# Patient Record
Sex: MALE | Race: WHITE | NOT HISPANIC OR LATINO | Employment: OTHER | URBAN - METROPOLITAN AREA
[De-identification: names, ages, dates, MRNs, and addresses within clinical notes are randomized per-mention and may not be internally consistent; named-entity substitution may affect disease eponyms.]

---

## 2023-10-27 DIAGNOSIS — E11.69 TYPE 2 DIABETES MELLITUS WITH OTHER SPECIFIED COMPLICATION, WITHOUT LONG-TERM CURRENT USE OF INSULIN (MULTI): ICD-10-CM

## 2023-10-27 RX ORDER — METFORMIN HYDROCHLORIDE 1000 MG/1
1000 TABLET ORAL
COMMUNITY
Start: 2023-09-08 | End: 2023-10-27 | Stop reason: SDUPTHER

## 2023-11-01 DIAGNOSIS — E11.59 HYPERTENSION ASSOCIATED WITH TYPE 2 DIABETES MELLITUS (MULTI): ICD-10-CM

## 2023-11-01 DIAGNOSIS — I15.2 HYPERTENSION ASSOCIATED WITH TYPE 2 DIABETES MELLITUS (MULTI): ICD-10-CM

## 2023-11-01 DIAGNOSIS — E11.69 HYPERLIPIDEMIA ASSOCIATED WITH TYPE 2 DIABETES MELLITUS (MULTI): Primary | ICD-10-CM

## 2023-11-01 DIAGNOSIS — E78.5 HYPERLIPIDEMIA ASSOCIATED WITH TYPE 2 DIABETES MELLITUS (MULTI): Primary | ICD-10-CM

## 2023-11-01 RX ORDER — LOSARTAN POTASSIUM 50 MG/1
50 TABLET ORAL DAILY
COMMUNITY
End: 2023-11-01 | Stop reason: SDUPTHER

## 2023-11-01 RX ORDER — ATORVASTATIN CALCIUM 40 MG/1
40 TABLET, FILM COATED ORAL NIGHTLY
COMMUNITY
End: 2023-11-01 | Stop reason: SDUPTHER

## 2023-11-01 RX ORDER — HYDROCHLOROTHIAZIDE 12.5 MG/1
12.5 TABLET ORAL DAILY
COMMUNITY
End: 2023-11-01 | Stop reason: SDUPTHER

## 2023-11-01 RX ORDER — METFORMIN HYDROCHLORIDE 1000 MG/1
1000 TABLET ORAL
Qty: 180 TABLET | Refills: 1 | Status: SHIPPED | OUTPATIENT
Start: 2023-11-01 | End: 2024-05-14 | Stop reason: SDUPTHER

## 2023-11-01 NOTE — TELEPHONE ENCOUNTER
Patient's wife Misa calling to advise that [facility] pharmacy has been trying to obtain refills on Atorvastatin and hydrochlorothiazide.  LOV 8/30/23

## 2023-11-02 ENCOUNTER — TELEPHONE (OUTPATIENT)
Dept: PRIMARY CARE | Facility: CLINIC | Age: 73
End: 2023-11-02

## 2023-11-02 RX ORDER — ATORVASTATIN CALCIUM 40 MG/1
40 TABLET, FILM COATED ORAL NIGHTLY
Qty: 90 TABLET | Refills: 1 | Status: SHIPPED | OUTPATIENT
Start: 2023-11-02 | End: 2024-05-14 | Stop reason: SDUPTHER

## 2023-11-02 RX ORDER — HYDROCHLOROTHIAZIDE 12.5 MG/1
12.5 TABLET ORAL DAILY
Qty: 90 TABLET | Refills: 1 | Status: SHIPPED | OUTPATIENT
Start: 2023-11-02 | End: 2024-05-14 | Stop reason: SDUPTHER

## 2023-11-02 RX ORDER — LOSARTAN POTASSIUM 50 MG/1
50 TABLET ORAL DAILY
Qty: 90 TABLET | Refills: 1 | Status: SHIPPED | OUTPATIENT
Start: 2023-11-02 | End: 2024-05-14 | Stop reason: SDUPTHER

## 2023-11-02 NOTE — TELEPHONE ENCOUNTER
PATIENT'S WIFE CALLING TO REPORT THAT RX FOR METFORMIN WAS SENT TWICE DAILY AND RX WAS CHANGED TO DAILY AT LAST OV. I LOOKED AT LAST OV AND ADVISED THAT PHYSICIAN STATED THAT PATIENT CAN REDUCE IF HE CONTINUES TO LOOSE WEIGHT AND COMPLIANT WITH DIET. WIFE STATED THAT ACTUALLY PATIENT GAINED WEIGHT SINCE THEY JUST GOT BACK FROM VACATION, BS'S ARE RUNNING AROUND 110. STATED THAT PATIENT WILL TAKE METFORMIN BID, MONITOR WEIGHT AND BS'S FOR NOW AND RE-EVALUATE AT NEXT OV. NOTE SENT TO DR. MAGALI MEEKS MD FOR FYI.

## 2023-11-03 DIAGNOSIS — I15.2 HYPERTENSION ASSOCIATED WITH TYPE 2 DIABETES MELLITUS (MULTI): ICD-10-CM

## 2023-11-03 DIAGNOSIS — E11.59 HYPERTENSION ASSOCIATED WITH TYPE 2 DIABETES MELLITUS (MULTI): ICD-10-CM

## 2023-11-03 RX ORDER — AMLODIPINE BESYLATE 5 MG/1
5 TABLET ORAL DAILY
COMMUNITY
Start: 2023-07-16 | End: 2023-11-03 | Stop reason: SDUPTHER

## 2023-11-03 NOTE — TELEPHONE ENCOUNTER
Patients wife is requesting a refill on the following medication to be sent to [facility] in [place].     Last ov 8/2023  Pending OV: none

## 2023-11-06 RX ORDER — AMLODIPINE BESYLATE 5 MG/1
5 TABLET ORAL DAILY
Qty: 90 TABLET | Refills: 1 | Status: SHIPPED | OUTPATIENT
Start: 2023-11-06 | End: 2023-11-08 | Stop reason: SDUPTHER

## 2023-11-08 DIAGNOSIS — I15.2 HYPERTENSION ASSOCIATED WITH TYPE 2 DIABETES MELLITUS (MULTI): ICD-10-CM

## 2023-11-08 DIAGNOSIS — E11.59 HYPERTENSION ASSOCIATED WITH TYPE 2 DIABETES MELLITUS (MULTI): ICD-10-CM

## 2023-11-08 RX ORDER — AMLODIPINE BESYLATE 5 MG/1
5 TABLET ORAL DAILY
Qty: 90 TABLET | Refills: 1 | Status: SHIPPED | OUTPATIENT
Start: 2023-11-08 | End: 2024-05-14 | Stop reason: SDUPTHER

## 2024-02-16 ENCOUNTER — TELEPHONE (OUTPATIENT)
Dept: PRIMARY CARE | Facility: CLINIC | Age: 74
End: 2024-02-16

## 2024-02-16 DIAGNOSIS — M54.50 CHRONIC BILATERAL LOW BACK PAIN, UNSPECIFIED WHETHER SCIATICA PRESENT: Primary | ICD-10-CM

## 2024-02-16 DIAGNOSIS — G89.29 CHRONIC BILATERAL LOW BACK PAIN, UNSPECIFIED WHETHER SCIATICA PRESENT: Primary | ICD-10-CM

## 2024-02-16 NOTE — TELEPHONE ENCOUNTER
Patient and patients wife called office today for a renewal of his handicap placards. Patients wife advises that they need 2 for both of his vehicles Please advise thanks!
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
## 2024-05-14 ENCOUNTER — TELEPHONE (OUTPATIENT)
Dept: PRIMARY CARE | Facility: CLINIC | Age: 74
End: 2024-05-14

## 2024-05-14 DIAGNOSIS — E11.69 HYPERLIPIDEMIA ASSOCIATED WITH TYPE 2 DIABETES MELLITUS (MULTI): ICD-10-CM

## 2024-05-14 DIAGNOSIS — I15.2 HYPERTENSION ASSOCIATED WITH TYPE 2 DIABETES MELLITUS (MULTI): ICD-10-CM

## 2024-05-14 DIAGNOSIS — E11.69 TYPE 2 DIABETES MELLITUS WITH OTHER SPECIFIED COMPLICATION, WITHOUT LONG-TERM CURRENT USE OF INSULIN (MULTI): ICD-10-CM

## 2024-05-14 DIAGNOSIS — E11.59 HYPERTENSION ASSOCIATED WITH TYPE 2 DIABETES MELLITUS (MULTI): ICD-10-CM

## 2024-05-14 DIAGNOSIS — E78.5 HYPERLIPIDEMIA ASSOCIATED WITH TYPE 2 DIABETES MELLITUS (MULTI): ICD-10-CM

## 2024-05-14 RX ORDER — HYDROCHLOROTHIAZIDE 12.5 MG/1
12.5 TABLET ORAL DAILY
Qty: 90 TABLET | Refills: 1 | Status: SHIPPED | OUTPATIENT
Start: 2024-05-14

## 2024-05-14 RX ORDER — METFORMIN HYDROCHLORIDE 1000 MG/1
1000 TABLET ORAL
Qty: 180 TABLET | Refills: 1 | Status: SHIPPED | OUTPATIENT
Start: 2024-05-14

## 2024-05-14 RX ORDER — ATORVASTATIN CALCIUM 40 MG/1
40 TABLET, FILM COATED ORAL NIGHTLY
Qty: 90 TABLET | Refills: 1 | Status: SHIPPED | OUTPATIENT
Start: 2024-05-14

## 2024-05-14 RX ORDER — AMLODIPINE BESYLATE 5 MG/1
5 TABLET ORAL DAILY
Qty: 90 TABLET | Refills: 1 | Status: SHIPPED | OUTPATIENT
Start: 2024-05-14 | End: 2025-05-14

## 2024-05-14 RX ORDER — LOSARTAN POTASSIUM 50 MG/1
50 TABLET ORAL DAILY
Qty: 90 TABLET | Refills: 1 | Status: SHIPPED | OUTPATIENT
Start: 2024-05-14

## 2024-05-14 NOTE — TELEPHONE ENCOUNTER
Visit date 8/2023    [facility] - [place], [place] - [address]  [address]  [facility] [address]  Phone: [phone] Fax: [phone]    Next office visit none  The patient refuse to make a follow up appointment they wanted to wait a couple of months to book. I told them the doctor likes to check the sugars regularly. But they decline to make an appointment, Please advise

## 2024-06-18 ENCOUNTER — TELEPHONE (OUTPATIENT)
Dept: PRIMARY CARE | Facility: CLINIC | Age: 74
End: 2024-06-18

## 2024-06-18 DIAGNOSIS — E11.69 TYPE 2 DIABETES MELLITUS WITH OTHER SPECIFIED COMPLICATION, WITHOUT LONG-TERM CURRENT USE OF INSULIN (MULTI): ICD-10-CM

## 2024-06-18 NOTE — TELEPHONE ENCOUNTER
Per clinical personnel:    please call the patient and have him book an appointment he wants refills but has not been seen since August of last year.

## 2024-06-18 NOTE — TELEPHONE ENCOUNTER
Visit date 8/30/2023    [facility] - [place], [place] - [address]  [address]  [facility] [address]  Phone: [phone] Fax: [phone]    Next office visit none

## 2024-06-21 RX ORDER — METFORMIN HYDROCHLORIDE 1000 MG/1
1000 TABLET ORAL
Qty: 180 TABLET | Refills: 1 | Status: SHIPPED | OUTPATIENT
Start: 2024-06-21

## 2024-11-22 ENCOUNTER — HOSPITAL ENCOUNTER (EMERGENCY)
Facility: HOSPITAL | Age: 74
Discharge: HOME | End: 2024-11-23
Attending: STUDENT IN AN ORGANIZED HEALTH CARE EDUCATION/TRAINING PROGRAM
Payer: MEDICARE

## 2024-11-22 DIAGNOSIS — N28.9 RENAL LESION: ICD-10-CM

## 2024-11-22 DIAGNOSIS — N13.8 URINARY TRACT OBSTRUCTION BY KIDNEY STONE: Primary | ICD-10-CM

## 2024-11-22 DIAGNOSIS — N20.0 URINARY TRACT OBSTRUCTION BY KIDNEY STONE: Primary | ICD-10-CM

## 2024-11-22 PROCEDURE — 99285 EMERGENCY DEPT VISIT HI MDM: CPT | Performed by: STUDENT IN AN ORGANIZED HEALTH CARE EDUCATION/TRAINING PROGRAM

## 2024-11-22 PROCEDURE — 99284 EMERGENCY DEPT VISIT MOD MDM: CPT

## 2024-11-22 ASSESSMENT — LIFESTYLE VARIABLES
HAVE PEOPLE ANNOYED YOU BY CRITICIZING YOUR DRINKING: NO
EVER FELT BAD OR GUILTY ABOUT YOUR DRINKING: NO
EVER HAD A DRINK FIRST THING IN THE MORNING TO STEADY YOUR NERVES TO GET RID OF A HANGOVER: NO
HAVE YOU EVER FELT YOU SHOULD CUT DOWN ON YOUR DRINKING: NO
TOTAL SCORE: 0

## 2024-11-22 ASSESSMENT — COLUMBIA-SUICIDE SEVERITY RATING SCALE - C-SSRS
2. HAVE YOU ACTUALLY HAD ANY THOUGHTS OF KILLING YOURSELF?: NO
1. IN THE PAST MONTH, HAVE YOU WISHED YOU WERE DEAD OR WISHED YOU COULD GO TO SLEEP AND NOT WAKE UP?: NO
6. HAVE YOU EVER DONE ANYTHING, STARTED TO DO ANYTHING, OR PREPARED TO DO ANYTHING TO END YOUR LIFE?: NO

## 2024-11-22 ASSESSMENT — PAIN - FUNCTIONAL ASSESSMENT: PAIN_FUNCTIONAL_ASSESSMENT: 0-10

## 2024-11-22 ASSESSMENT — PAIN SCALES - GENERAL: PAINLEVEL_OUTOF10: 6

## 2024-11-23 ENCOUNTER — APPOINTMENT (OUTPATIENT)
Dept: RADIOLOGY | Facility: HOSPITAL | Age: 74
End: 2024-11-23
Payer: MEDICARE

## 2024-11-23 VITALS
TEMPERATURE: 98.2 F | BODY MASS INDEX: 33.24 KG/M2 | RESPIRATION RATE: 16 BRPM | WEIGHT: 259 LBS | OXYGEN SATURATION: 95 % | DIASTOLIC BLOOD PRESSURE: 86 MMHG | SYSTOLIC BLOOD PRESSURE: 180 MMHG | HEIGHT: 74 IN | HEART RATE: 73 BPM

## 2024-11-23 LAB
ALBUMIN SERPL BCP-MCNC: 4.1 G/DL (ref 3.4–5)
ALP SERPL-CCNC: 84 U/L (ref 33–136)
ALT SERPL W P-5'-P-CCNC: 16 U/L (ref 10–52)
ANION GAP SERPL CALC-SCNC: 15 MMOL/L (ref 10–20)
APPEARANCE UR: CLEAR
AST SERPL W P-5'-P-CCNC: 16 U/L (ref 9–39)
BASOPHILS # BLD AUTO: 0.05 X10*3/UL (ref 0–0.1)
BASOPHILS NFR BLD AUTO: 0.4 %
BILIRUB SERPL-MCNC: 0.7 MG/DL (ref 0–1.2)
BILIRUB UR STRIP.AUTO-MCNC: NEGATIVE MG/DL
BUN SERPL-MCNC: 21 MG/DL (ref 6–23)
CALCIUM SERPL-MCNC: 9.5 MG/DL (ref 8.6–10.3)
CHLORIDE SERPL-SCNC: 102 MMOL/L (ref 98–107)
CO2 SERPL-SCNC: 23 MMOL/L (ref 21–32)
COLOR UR: ABNORMAL
CREAT SERPL-MCNC: 1.15 MG/DL (ref 0.5–1.3)
EGFRCR SERPLBLD CKD-EPI 2021: 67 ML/MIN/1.73M*2
EOSINOPHIL # BLD AUTO: 0.62 X10*3/UL (ref 0–0.4)
EOSINOPHIL NFR BLD AUTO: 5.3 %
ERYTHROCYTE [DISTWIDTH] IN BLOOD BY AUTOMATED COUNT: 13.1 % (ref 11.5–14.5)
GLUCOSE SERPL-MCNC: 118 MG/DL (ref 74–99)
GLUCOSE UR STRIP.AUTO-MCNC: NORMAL MG/DL
HCT VFR BLD AUTO: 45.3 % (ref 41–52)
HGB BLD-MCNC: 15.1 G/DL (ref 13.5–17.5)
HOLD SPECIMEN: NORMAL
IMM GRANULOCYTES # BLD AUTO: 0.04 X10*3/UL (ref 0–0.5)
IMM GRANULOCYTES NFR BLD AUTO: 0.3 % (ref 0–0.9)
KETONES UR STRIP.AUTO-MCNC: ABNORMAL MG/DL
LEUKOCYTE ESTERASE UR QL STRIP.AUTO: NEGATIVE
LIPASE SERPL-CCNC: 23 U/L (ref 9–82)
LYMPHOCYTES # BLD AUTO: 1.73 X10*3/UL (ref 0.8–3)
LYMPHOCYTES NFR BLD AUTO: 14.8 %
MCH RBC QN AUTO: 30.2 PG (ref 26–34)
MCHC RBC AUTO-ENTMCNC: 33.3 G/DL (ref 32–36)
MCV RBC AUTO: 91 FL (ref 80–100)
MONOCYTES # BLD AUTO: 0.88 X10*3/UL (ref 0.05–0.8)
MONOCYTES NFR BLD AUTO: 7.5 %
MUCOUS THREADS #/AREA URNS AUTO: ABNORMAL /LPF
NEUTROPHILS # BLD AUTO: 8.4 X10*3/UL (ref 1.6–5.5)
NEUTROPHILS NFR BLD AUTO: 71.7 %
NITRITE UR QL STRIP.AUTO: NEGATIVE
NRBC BLD-RTO: 0 /100 WBCS (ref 0–0)
PH UR STRIP.AUTO: 6 [PH]
PLATELET # BLD AUTO: 206 X10*3/UL (ref 150–450)
POTASSIUM SERPL-SCNC: 3.5 MMOL/L (ref 3.5–5.3)
PROT SERPL-MCNC: 6.5 G/DL (ref 6.4–8.2)
PROT UR STRIP.AUTO-MCNC: ABNORMAL MG/DL
RBC # BLD AUTO: 5 X10*6/UL (ref 4.5–5.9)
RBC # UR STRIP.AUTO: ABNORMAL /UL
RBC #/AREA URNS AUTO: ABNORMAL /HPF
SODIUM SERPL-SCNC: 136 MMOL/L (ref 136–145)
SP GR UR STRIP.AUTO: >1.05
UROBILINOGEN UR STRIP.AUTO-MCNC: NORMAL MG/DL
WBC # BLD AUTO: 11.7 X10*3/UL (ref 4.4–11.3)
WBC #/AREA URNS AUTO: ABNORMAL /HPF

## 2024-11-23 PROCEDURE — 2500000002 HC RX 250 W HCPCS SELF ADMINISTERED DRUGS (ALT 637 FOR MEDICARE OP, ALT 636 FOR OP/ED): Performed by: STUDENT IN AN ORGANIZED HEALTH CARE EDUCATION/TRAINING PROGRAM

## 2024-11-23 PROCEDURE — 36415 COLL VENOUS BLD VENIPUNCTURE: CPT

## 2024-11-23 PROCEDURE — 85025 COMPLETE CBC W/AUTO DIFF WBC: CPT

## 2024-11-23 PROCEDURE — 2550000001 HC RX 255 CONTRASTS: Performed by: STUDENT IN AN ORGANIZED HEALTH CARE EDUCATION/TRAINING PROGRAM

## 2024-11-23 PROCEDURE — 2500000004 HC RX 250 GENERAL PHARMACY W/ HCPCS (ALT 636 FOR OP/ED)

## 2024-11-23 PROCEDURE — 96374 THER/PROPH/DIAG INJ IV PUSH: CPT | Mod: 59

## 2024-11-23 PROCEDURE — 80053 COMPREHEN METABOLIC PANEL: CPT

## 2024-11-23 PROCEDURE — 81001 URINALYSIS AUTO W/SCOPE: CPT

## 2024-11-23 PROCEDURE — 74174 CTA ABD&PLVS W/CONTRAST: CPT | Performed by: RADIOLOGY

## 2024-11-23 PROCEDURE — 74174 CTA ABD&PLVS W/CONTRAST: CPT

## 2024-11-23 PROCEDURE — 2500000004 HC RX 250 GENERAL PHARMACY W/ HCPCS (ALT 636 FOR OP/ED): Performed by: STUDENT IN AN ORGANIZED HEALTH CARE EDUCATION/TRAINING PROGRAM

## 2024-11-23 PROCEDURE — 83690 ASSAY OF LIPASE: CPT

## 2024-11-23 PROCEDURE — 96375 TX/PRO/DX INJ NEW DRUG ADDON: CPT | Mod: 59

## 2024-11-23 RX ORDER — TAMSULOSIN HYDROCHLORIDE 0.4 MG/1
0.4 CAPSULE ORAL ONCE
Status: COMPLETED | OUTPATIENT
Start: 2024-11-23 | End: 2024-11-23

## 2024-11-23 RX ORDER — ONDANSETRON 4 MG/1
4 TABLET, FILM COATED ORAL EVERY 6 HOURS
Qty: 12 TABLET | Refills: 0 | Status: SHIPPED | OUTPATIENT
Start: 2024-11-23 | End: 2024-11-26

## 2024-11-23 RX ORDER — TAMSULOSIN HYDROCHLORIDE 0.4 MG/1
0.4 CAPSULE ORAL DAILY
Qty: 10 CAPSULE | Refills: 0 | Status: SHIPPED | OUTPATIENT
Start: 2024-11-23 | End: 2024-12-03

## 2024-11-23 RX ORDER — KETOROLAC TROMETHAMINE 15 MG/ML
15 INJECTION, SOLUTION INTRAMUSCULAR; INTRAVENOUS ONCE
Status: COMPLETED | OUTPATIENT
Start: 2024-11-23 | End: 2024-11-23

## 2024-11-23 RX ORDER — MORPHINE SULFATE 4 MG/ML
4 INJECTION, SOLUTION INTRAMUSCULAR; INTRAVENOUS ONCE
Status: COMPLETED | OUTPATIENT
Start: 2024-11-23 | End: 2024-11-23

## 2024-11-23 RX ORDER — OXYCODONE HYDROCHLORIDE 5 MG/1
5 TABLET ORAL EVERY 8 HOURS PRN
Qty: 9 TABLET | Refills: 0 | Status: SHIPPED | OUTPATIENT
Start: 2024-11-23 | End: 2024-11-26

## 2024-11-23 RX ORDER — NAPROXEN 500 MG/1
500 TABLET ORAL 2 TIMES DAILY PRN
Qty: 20 TABLET | Refills: 0 | Status: SHIPPED | OUTPATIENT
Start: 2024-11-23

## 2024-11-23 RX ADMIN — TAMSULOSIN HYDROCHLORIDE 0.4 MG: 0.4 CAPSULE ORAL at 03:48

## 2024-11-23 RX ADMIN — KETOROLAC TROMETHAMINE 15 MG: 15 INJECTION, SOLUTION INTRAMUSCULAR; INTRAVENOUS at 03:48

## 2024-11-23 RX ADMIN — MORPHINE SULFATE 4 MG: 4 INJECTION, SOLUTION INTRAMUSCULAR; INTRAVENOUS at 02:35

## 2024-11-23 RX ADMIN — IOHEXOL 100 ML: 350 INJECTION, SOLUTION INTRAVENOUS at 02:09

## 2024-11-23 ASSESSMENT — PAIN SCALES - GENERAL
PAINLEVEL_OUTOF10: 0 - NO PAIN
PAINLEVEL_OUTOF10: 5 - MODERATE PAIN
PAINLEVEL_OUTOF10: 0 - NO PAIN

## 2024-11-23 ASSESSMENT — PAIN - FUNCTIONAL ASSESSMENT: PAIN_FUNCTIONAL_ASSESSMENT: 0-10

## 2024-11-23 ASSESSMENT — PAIN DESCRIPTION - LOCATION: LOCATION: ABDOMEN

## 2024-11-23 NOTE — ED PROVIDER NOTES
EMERGENCY DEPARTMENT ENCOUNTER      Pt Name: Kirill Adam  MRN: [id number]  Birthdate 1950  Date of evaluation: 11/22/2024  Provider: Easton Tadeo DO    CHIEF COMPLAINT       Chief Complaint   Patient presents with    Abdominal Pain     Abd pain RLQ that is constant 6/10 dull pain. Pt has a bowel movement this morning and noticed bright red blood in the toilet; pt had x2 bouts of bleeding from his rectum today.          HISTORY OF PRESENT ILLNESS    74-year-old male, history of diabetes, ventral wall abdominal hernia repair, hypertension, hyperlipidemia, comes emergency with intermittent right lower quadrant abdominal pain, increased bloating.  Bloating was started yesterday, right lower quadrant abdominal pain today, last time he had pain was on the way here, not currently having it.  Did have 2 episodes of bloody stools today, has been having harder stools than normal over the last couple days.  States active bleeding after a hard stool.  No lightheadedness, no chest pain, no shortness of breath.  No vomiting or nausea, no chills, no frequency, urgency, hematuria, dysuria.  No other symptoms.      History provided by:  Patient      Nursing Notes were reviewed.    PAST MEDICAL HISTORY     Past Medical History:   Diagnosis Date    Diabetes mellitus (Multi)     Hypercholesteremia     Hypertension          SURGICAL HISTORY       Past Surgical History:   Procedure Laterality Date    OTHER SURGICAL HISTORY  04/29/2022    Back surgery    OTHER SURGICAL HISTORY  04/29/2022    Eye surgery    OTHER SURGICAL HISTORY  04/29/2022    Foot fracture repair    OTHER SURGICAL HISTORY  04/29/2022    Hernia repair         CURRENT MEDICATIONS       Previous Medications    AMLODIPINE (NORVASC) 5 MG TABLET    Take 1 tablet (5 mg) by mouth once daily.    ATORVASTATIN (LIPITOR) 40 MG TABLET    Take 1 tablet (40 mg) by mouth once daily at bedtime.    HYDROCHLOROTHIAZIDE (MICROZIDE) 12.5 MG TABLET    Take 1 tablet (12.5 mg) by mouth  once daily.    LOSARTAN (COZAAR) 50 MG TABLET    Take 1 tablet (50 mg) by mouth once daily.    METFORMIN (GLUCOPHAGE) 1,000 MG TABLET    Take 1 tablet (1,000 mg) by mouth 2 times daily (morning and late afternoon). Take with food.       ALLERGIES     Patient has no known allergies.    FAMILY HISTORY     No family history on file.       SOCIAL HISTORY       Social History     Socioeconomic History    Marital status:    Tobacco Use    Smoking status: Never    Smokeless tobacco: Never   Vaping Use    Vaping status: Never Used   Substance and Sexual Activity    Alcohol use: Yes     Comment: socially    Drug use: Never       SCREENINGS                        PHYSICAL EXAM    (up to 7 for level 4, 8 or more for level 5)     ED Triage Vitals [11/22/24 2339]   Temperature Heart Rate Respirations BP   36.8 °C (98.2 °F) 74 18 (!) 203/98      Pulse Ox Temp Source Heart Rate Source Patient Position   95 % Temporal -- --      BP Location FiO2 (%)     -- --       Physical Exam  Vitals and nursing note reviewed.   Constitutional:       General: He is not in acute distress.  HENT:      Head: Normocephalic and atraumatic.   Eyes:      General: No scleral icterus.        Right eye: No discharge.         Left eye: No discharge.      Conjunctiva/sclera: Conjunctivae normal.   Cardiovascular:      Rate and Rhythm: Normal rate and regular rhythm.      Pulses: Normal pulses.   Pulmonary:      Effort: Pulmonary effort is normal.   Abdominal:      General: Abdomen is flat.      Palpations: Abdomen is soft.      Tenderness: There is no abdominal tenderness. There is no guarding or rebound.   Genitourinary:     Comments: There is dried blood around the rectum and on patient's underwear.  Blood is bright red.  There are no obvious anal fissures.  There is a small external hemorrhoid.  No active bleeding.  Musculoskeletal:         General: No deformity.      Right lower leg: No edema.      Left lower leg: No edema.   Skin:     General:  Skin is warm and dry.   Neurological:      Mental Status: He is alert and oriented to person, place, and time. Mental status is at baseline.   Psychiatric:         Mood and Affect: Mood normal.         Behavior: Behavior normal.          DIAGNOSTIC RESULTS     LABS:  Labs Reviewed   CBC WITH AUTO DIFFERENTIAL - Abnormal       Result Value    WBC 11.7 (*)     nRBC 0.0      RBC 5.00      Hemoglobin 15.1      Hematocrit 45.3      MCV 91      MCH 30.2      MCHC 33.3      RDW 13.1      Platelets 206      Neutrophils % 71.7      Immature Granulocytes %, Automated 0.3      Lymphocytes % 14.8      Monocytes % 7.5      Eosinophils % 5.3      Basophils % 0.4      Neutrophils Absolute 8.40 (*)     Immature Granulocytes Absolute, Automated 0.04      Lymphocytes Absolute 1.73      Monocytes Absolute 0.88 (*)     Eosinophils Absolute 0.62 (*)     Basophils Absolute 0.05     COMPREHENSIVE METABOLIC PANEL - Abnormal    Glucose 118 (*)     Sodium 136      Potassium 3.5      Chloride 102      Bicarbonate 23      Anion Gap 15      Urea Nitrogen 21      Creatinine 1.15      eGFR 67      Calcium 9.5      Albumin 4.1      Alkaline Phosphatase 84      Total Protein 6.5      AST 16      Bilirubin, Total 0.7      ALT 16     URINALYSIS WITH REFLEX CULTURE AND MICROSCOPIC - Abnormal    Color, Urine Light-Yellow      Appearance, Urine Clear      Specific Gravity, Urine >1.050 (*)     pH, Urine 6.0      Protein, Urine 10 (TRACE)      Glucose, Urine Normal      Blood, Urine 0.1 (1+) (*)     Ketones, Urine TRACE (*)     Bilirubin, Urine NEGATIVE      Urobilinogen, Urine Normal      Nitrite, Urine NEGATIVE      Leukocyte Esterase, Urine NEGATIVE     URINALYSIS MICROSCOPIC WITH REFLEX CULTURE - Abnormal    WBC, Urine NONE      RBC, Urine 11-20 (*)     Mucus, Urine FEW     LIPASE - Normal    Lipase 23      Narrative:     Venipuncture immediately after or during the administration of Metamizole may lead to falsely low results. Testing should be  performed immediately prior to Metamizole dosing.   URINALYSIS WITH REFLEX CULTURE AND MICROSCOPIC    Narrative:     The following orders were created for panel order Urinalysis with Reflex Culture and Microscopic.  Procedure                               Abnormality         Status                     ---------                               -----------         ------                     Urinalysis with Reflex C...[[id number]]  Abnormal            Final result               Extra Urine Gray Tube[[id number]]                            In process                   Please view results for these tests on the individual orders.   EXTRA URINE GRAY TUBE       All other labs were within normal range or not returned as of this dictation.    Imaging  CT angio abdomen pelvis w and or wo IV IV contrast   Final Result   No evidence of active GI bleed.        Colonic diverticulosis without acute diverticulitis.        No aortic aneurysm or dissection. No significant stenosis of the   aorta or visceral branches.        6 mm calculus in the distal right ureter with mild right   hydronephrosis.        Indeterminate enhancement of an 11 mm cortical lesion in the lower   pole left kidney. The lesion is not well seen on the precontrast CT.   The follow-up nonemergent renal protocol MRI is recommended to   exclude a solid lesion.        MACRO:   Critical Finding:  See findings. Notification was initiated on   11/23/2024 at 2:45 am by  Katharine Jones.  (**-YCF-**) Instructions:        Signed by: Katharine Jones 11/23/2024 2:45 AM   Dictation workstation:   [id number]           Procedures  Procedures     EMERGENCY DEPARTMENT COURSE/MDM:     Diagnoses as of 11/23/24 0405   Urinary tract obstruction by kidney stone   Renal lesion        Medical Decision Making  74-year-old male comes emergency with right lower quadrant abdominal pain, differentials for kidney stone, UTI, appendicitis.  Abdominal lab workup, CT abdomen ordered to assess for  these etiologies.  He was having some rectal bleeding as well, likely secondary to a hemorrhoid, did do a rectal exam, saw obvious dried blood on patient's underwear, and a small external hemorrhoid but no anal fissure.    On my independent review of labs, he does have a small white blood cell count 11.7.  Normal hemoglobin, normal platelet count.  Chemistry shows no electrolyte derangements, normal renal and hepatic function.  Urinalysis shows blood, no UTI, lipase negative, no CT evidence of pancreatitis.  He does have an incidental finding on the CT scan, he was given a copy of his CT report printed to him, referral was placed to the [facility] diagnostic clinic, he will also follow-up with his PCP for further management of this.  He also has a right sided kidney stone, 6 mm with some hydronephrosis as well.  No CT evidence of appendicitis.  He was given morphine, Toradol, 15 mg IV Toradol, 4 mg IV morphine for pain management, given a 0.4 mg oral tamsulosin here in the ER for his kidney stone.  On reassessment he was comfortable, pain-free, ready to be discharged home at this time.  He was given a prescription for naproxen, Oxy, Zofran, Flomax upon discharge, and he will follow-up with his PCP.  He will return for any fevers, chills, worsening pain, or any other new concerning symptoms.  He is amenable to this plan.      Patient and or family in agreement and understanding of treatment plan.  All questions answered.      I reviewed the case with the attending ED physician. The attending ED physician agrees with the plan. Patient and/or patient´s representative was counseled regarding labs, imaging, likely diagnosis, and plan. All questions were answered.    ED Medications administered this visit:    Medications   iohexol (OMNIPaque) 350 mg iodine/mL solution 100 mL (100 mL intravenous Given 11/23/24 0209)   morphine injection 4 mg (4 mg intravenous Given 11/23/24 0235)   ketorolac (Toradol) injection 15 mg (15 mg  intravenous Given 11/23/24 0348)   tamsulosin (Flomax) 24 hr capsule 0.4 mg (0.4 mg oral Given 11/23/24 0348)       New Prescriptions from this visit:    New Prescriptions    NAPROXEN (NAPROSYN) 500 MG TABLET    Take 1 tablet (500 mg) by mouth 2 times a day as needed (pain).    ONDANSETRON (ZOFRAN) 4 MG TABLET    Take 1 tablet (4 mg) by mouth every 6 hours for 3 days.    OXYCODONE (ROXICODONE) 5 MG IMMEDIATE RELEASE TABLET    Take 1 tablet (5 mg) by mouth every 8 hours if needed for severe pain (7 - 10) for up to 3 days.    TAMSULOSIN (FLOMAX) 0.4 MG 24 HR CAPSULE    Take 1 capsule (0.4 mg) by mouth once daily for 10 days.       Follow-up:  Patricio Bryson MD  [id number]  [place] [address] [phone]    Schedule an appointment as soon as possible for a visit       [facility]  [address] [address] [address] [phone]            Final Impression:   1. Urinary tract obstruction by kidney stone    2. Renal lesion          (Please note that portions of this note were completed with a voice recognition program.  Efforts were made to edit the dictations but occasionally words are mis-transcribed.)     Easton Tadeo DO  Resident  11/23/24 4702

## 2024-11-23 NOTE — DISCHARGE INSTRUCTIONS
Follow-up with your primary care doctor.  You have incidental findings on your CT scan of the renal lesion on the left side which needs further workup, show this your primary care doctor for further management, we also placed a referral to [facility] for further management and workup of this.  Neurology referrals been placed as well, follow-up with them.  Take Zofran as needed for nausea, naproxen twice a day as prescribed, tamsulosin once a day, oxycodone only for breakthrough pain, return for new, concerning or worsening symptoms.  The symptoms include fevers, worsening and uncontrollable pain at home, intractable vomiting.

## 2024-11-25 ENCOUNTER — TELEPHONE (OUTPATIENT)
Dept: HEMATOLOGY/ONCOLOGY | Facility: HOSPITAL | Age: 74
End: 2024-11-25
Payer: MEDICARE

## 2024-11-25 DIAGNOSIS — I25.10 CORONARY ARTERY CALCIFICATION: ICD-10-CM

## 2024-11-25 DIAGNOSIS — N28.9 KIDNEY LESION, NATIVE, LEFT: Primary | ICD-10-CM

## 2024-11-25 NOTE — TELEPHONE ENCOUNTER
Misa (pt's wife) & Kirill returned my call to the office. I discussed the indeterminate left renal lesion & additional imaging for better characterization. They are agreeable. They inquired about the Urology referral placed by the ED team, for ureteral and renal calculi & enlarged prostate. I will ask the scheduling team to assist them w/ arranging an appt. They also voiced concern about the severe coronary artery calcifications noted on the CT imaging & requested a cardiology referral. Order placed; I will ask the schedulers to assist w/ arranging this appt also. I also suggested he discuss this finding w/ his PCP; they are agreeable w/ the plan.  CESAR Calles.NICOLE  [facility]

## 2024-11-25 NOTE — TELEPHONE ENCOUNTER
Referral placed to [facility] by Dr. Paulson, [facility] ED, for indeterminate left renal lesion, noted on CT imaging 11/23/24. I called the patient to discuss his imaging results and the referral, as well as to advise additional imaging. No answer, I left  requesting return call to office. Diagnostic Clinic contact information provided.   CESAR Calles.CNP  [facility]

## 2024-12-03 ENCOUNTER — TELEPHONE (OUTPATIENT)
Dept: PRIMARY CARE | Facility: CLINIC | Age: 74
End: 2024-12-03
Payer: MEDICARE

## 2024-12-03 DIAGNOSIS — N20.0 KIDNEY STONE: Primary | ICD-10-CM

## 2024-12-03 RX ORDER — TAMSULOSIN HYDROCHLORIDE 0.4 MG/1
0.4 CAPSULE ORAL DAILY
Qty: 30 CAPSULE | Refills: 11 | Status: SHIPPED | OUTPATIENT
Start: 2024-12-03 | End: 2025-12-03

## 2024-12-03 NOTE — TELEPHONE ENCOUNTER
The patient's wife called the office and stated the patient has been in the hospital with a kidney stone a lesion on his liver and enlarged prostate and sever coronary scaling. She states the patient will have an MRI on 12/12. The patient was given a medication  to help keep the vein open between the kidney and the bladder to help pass the stone. They are now out of that medication. She is having trouble getting a hold of urology. She is wondering if he still needs to be on this medication until he gets his MRI. Please advise to help her out of what she should do. The medication he is supposed to be taking is the Tamsulosin for 10 days . Still having intermittent pain .

## 2024-12-12 ENCOUNTER — HOSPITAL ENCOUNTER (OUTPATIENT)
Dept: RADIOLOGY | Facility: CLINIC | Age: 74
Discharge: HOME | End: 2024-12-12
Payer: MEDICARE

## 2024-12-12 DIAGNOSIS — N28.9 KIDNEY LESION, NATIVE, LEFT: ICD-10-CM

## 2024-12-12 PROCEDURE — 2550000001 HC RX 255 CONTRASTS: Performed by: NURSE PRACTITIONER

## 2024-12-12 PROCEDURE — 74183 MRI ABD W/O CNTR FLWD CNTR: CPT | Performed by: RADIOLOGY

## 2024-12-12 PROCEDURE — A9575 INJ GADOTERATE MEGLUMI 0.1ML: HCPCS | Performed by: NURSE PRACTITIONER

## 2024-12-12 PROCEDURE — 74183 MRI ABD W/O CNTR FLWD CNTR: CPT

## 2024-12-12 RX ORDER — GADOTERATE MEGLUMINE 376.9 MG/ML
20 INJECTION INTRAVENOUS
Status: COMPLETED | OUTPATIENT
Start: 2024-12-12 | End: 2024-12-12

## 2024-12-13 ENCOUNTER — TELEPHONE (OUTPATIENT)
Dept: HEMATOLOGY/ONCOLOGY | Facility: HOSPITAL | Age: 74
End: 2024-12-13
Payer: MEDICARE

## 2024-12-13 NOTE — TELEPHONE ENCOUNTER
12/13/2024 @ 0953:  Patient and wife called, stating they are returning call/VM message from Miriam Jovel CNP; stated that they are speaking to me via speakerphone.  Explained that Miriam was calling to review the patient's MRI results and to answer any questions they may have.  Patient states he has read his MRI results on [employer], but would still like to speak with Miriam.  They ask if Miriam could please call them after 1 PM today @ [phone].      Advised patient and his wife that I will convey this message to Miriam; they stated appreciation and awareness to call back with further questions or concerns.    Update sent to Miriam Jovel CNP.    Lalitha Gautam RN  [facility]

## 2024-12-13 NOTE — TELEPHONE ENCOUNTER
I called Mr. Adam to review his MRI results. No answer, I left  requesting return call to office. Diagnostic Clinic contact information provided.   CESAR Calles.NICOLE  [facility]

## 2024-12-13 NOTE — TELEPHONE ENCOUNTER
I called Mr. Adam & his wife and reviewed the results of his MRI abdomen -- the indeterminate lesion in his left kidney  corresponds to probable benign cyst w/ pseudo enhancement. He has an upcoming appt w/ Urology and will further review the imaging at that time. Discussed scattered pancreatic cysts, including dominant cystic focus measuring 11mm, without high risk features. Advised follow-up with MELVIN [place], in the pancreas cyst clinic. He is agreeable. I spoke w/ Zoë and her team will reach out to him to schedule an appt. All patient questions answered.   CESAR Calles.NICOLE  [facility]

## 2024-12-30 ENCOUNTER — OFFICE VISIT (OUTPATIENT)
Dept: SURGICAL ONCOLOGY | Facility: CLINIC | Age: 74
End: 2024-12-30
Payer: MEDICARE

## 2024-12-30 VITALS
OXYGEN SATURATION: 93 % | HEIGHT: 75 IN | SYSTOLIC BLOOD PRESSURE: 147 MMHG | HEART RATE: 87 BPM | WEIGHT: 259 LBS | BODY MASS INDEX: 32.2 KG/M2 | DIASTOLIC BLOOD PRESSURE: 76 MMHG

## 2024-12-30 DIAGNOSIS — D49.0 IPMN (INTRADUCTAL PAPILLARY MUCINOUS NEOPLASM): Primary | ICD-10-CM

## 2024-12-30 PROCEDURE — 1159F MED LIST DOCD IN RCRD: CPT | Performed by: PHYSICIAN ASSISTANT

## 2024-12-30 PROCEDURE — 99214 OFFICE O/P EST MOD 30 MIN: CPT | Performed by: PHYSICIAN ASSISTANT

## 2024-12-30 PROCEDURE — 3008F BODY MASS INDEX DOCD: CPT | Performed by: PHYSICIAN ASSISTANT

## 2024-12-30 PROCEDURE — 99204 OFFICE O/P NEW MOD 45 MIN: CPT | Performed by: PHYSICIAN ASSISTANT

## 2024-12-30 RX ORDER — MULTIVITAMIN
1 TABLET ORAL
COMMUNITY

## 2024-12-30 RX ORDER — ASPIRIN 81 MG/1
TABLET ORAL
COMMUNITY

## 2024-12-30 RX ORDER — ASCORBIC ACID 250 MG
250 TABLET ORAL DAILY
COMMUNITY

## 2024-12-30 RX ORDER — GLUCOSAMINE HCL 500 MG
TABLET ORAL
COMMUNITY

## 2024-12-30 NOTE — PROGRESS NOTES
"Subjective   Mr. Adam is a 74-year-old male who is referred by Miriam DOHERTY for pancreatic cysts.     He presented to the ED November with RLQ pain and rectal bleeding. He was found to have a 0.6 cm calculus in the distal right ureter with mild hydronephrosis and an indeterminate lesion in the left kidney.    He was referred to the Miriam Jovel in the Diagnostic Clinic and had a MRI Kidney on 12/12/24 that showed benign kidney cysts but also an incidental finding of cysts scattered throughout the pancreas with the dominant cyst measuring 1.1 cm with normal MPD. He was then referred here.    He denies a personal history of acute pancreatitis.    He denies chronic abdominal pain, early satiety, poor appetite, jaundice or unintentional weight loss.     Medical and surgical history: HTN, HLD, T2DM.   Family history: No pancreatic cancer.   Social history: Non-smoker. Occasional alcohol use. No illicits.     Objective     /76   Pulse 87   Ht 1.905 m (6' 3\")   Wt 117 kg (259 lb)   SpO2 93%   BMI 32.37 kg/m²      Physical Exam  General: in no acute distress, comfortable   Eyes: no pallor or scleral icterus  Respiratory: even, unlabored  Gastrointestinal: non-distended, abdomen soft, non-tender, no masses   Musculoskeletal: normal gate, no deformities   Integumentary: no concerning lesions, no jaundice  Neurologic: no gross deficits  Psychiatric: cognition intact, mood appropriate    Assessment/Plan   Mr. Adam is a 74-year-old male who is referred by Miriam DOHERTY for pancreatic cysts.     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 The dominant cyst is 1.1 cm in the neck of the pancreas.     I discussed that branch duct IPMNs represent potentially pre-malignant lesions and are managed based on the presence or absence of high risk stigmata or concerning features including cyst size, cyst growth over time, enhancing mural nodule or main duct dilatation. Management " decisions are based on these features, as well as, personal medical history, family history, presence or absence of symptoms and patient preference.    There are no high risk stigmata or worrisome features. I recommend annual surveillance. I will call him when he is next due.       Zoë Cadena PA-C

## 2025-01-08 ENCOUNTER — OFFICE VISIT (OUTPATIENT)
Dept: CARDIOLOGY | Facility: CLINIC | Age: 75
End: 2025-01-08
Payer: MEDICARE

## 2025-01-08 ENCOUNTER — APPOINTMENT (OUTPATIENT)
Dept: UROLOGY | Facility: CLINIC | Age: 75
End: 2025-01-08
Payer: MEDICARE

## 2025-01-08 VITALS
SYSTOLIC BLOOD PRESSURE: 136 MMHG | WEIGHT: 258 LBS | HEIGHT: 75 IN | HEART RATE: 69 BPM | DIASTOLIC BLOOD PRESSURE: 80 MMHG | BODY MASS INDEX: 32.08 KG/M2

## 2025-01-08 DIAGNOSIS — I25.10 ASHD (ARTERIOSCLEROTIC HEART DISEASE): ICD-10-CM

## 2025-01-08 DIAGNOSIS — I25.10 CORONARY ARTERY CALCIFICATION: ICD-10-CM

## 2025-01-08 DIAGNOSIS — Z76.89 ENCOUNTER TO ESTABLISH CARE WITH NEW DOCTOR: ICD-10-CM

## 2025-01-08 DIAGNOSIS — R94.31 ABNORMAL EKG: ICD-10-CM

## 2025-01-08 PROCEDURE — 1159F MED LIST DOCD IN RCRD: CPT | Performed by: INTERNAL MEDICINE

## 2025-01-08 PROCEDURE — 99204 OFFICE O/P NEW MOD 45 MIN: CPT | Performed by: INTERNAL MEDICINE

## 2025-01-08 PROCEDURE — 93000 ELECTROCARDIOGRAM COMPLETE: CPT | Performed by: INTERNAL MEDICINE

## 2025-01-08 PROCEDURE — 3008F BODY MASS INDEX DOCD: CPT | Performed by: INTERNAL MEDICINE
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
## 2025-01-08 NOTE — PATIENT INSTRUCTIONS
PLEASE BRING ALL MEDICATION BOTTLES TO OFFICE VISITS.     HAVE LABS DONE BEFORE NEXT OFFICE VISIT (FASTING LABS)

## 2025-01-13 ENCOUNTER — APPOINTMENT (OUTPATIENT)
Dept: UROLOGY | Facility: CLINIC | Age: 75
End: 2025-01-13
Payer: MEDICARE

## 2025-01-13 VITALS
WEIGHT: 252 LBS | HEIGHT: 75 IN | BODY MASS INDEX: 31.33 KG/M2 | TEMPERATURE: 98 F | DIASTOLIC BLOOD PRESSURE: 83 MMHG | SYSTOLIC BLOOD PRESSURE: 160 MMHG | HEART RATE: 74 BPM

## 2025-01-13 DIAGNOSIS — N20.0 NEPHROLITHIASIS: ICD-10-CM

## 2025-01-13 DIAGNOSIS — N13.8 URINARY TRACT OBSTRUCTION BY KIDNEY STONE: Primary | ICD-10-CM

## 2025-01-13 DIAGNOSIS — N20.0 URINARY TRACT OBSTRUCTION BY KIDNEY STONE: Primary | ICD-10-CM

## 2025-01-13 DIAGNOSIS — N13.2 HYDRONEPHROSIS WITH URINARY OBSTRUCTION DUE TO URETERAL CALCULUS: ICD-10-CM

## 2025-01-13 PROCEDURE — 1036F TOBACCO NON-USER: CPT | Performed by: NURSE PRACTITIONER

## 2025-01-13 PROCEDURE — 82365 CALCULUS SPECTROSCOPY: CPT

## 2025-01-13 PROCEDURE — 99214 OFFICE O/P EST MOD 30 MIN: CPT | Performed by: NURSE PRACTITIONER

## 2025-01-13 PROCEDURE — G2211 COMPLEX E/M VISIT ADD ON: HCPCS | Performed by: NURSE PRACTITIONER

## 2025-01-13 PROCEDURE — 1159F MED LIST DOCD IN RCRD: CPT | Performed by: NURSE PRACTITIONER

## 2025-01-13 PROCEDURE — 3008F BODY MASS INDEX DOCD: CPT | Performed by: NURSE PRACTITIONER

## 2025-01-13 ASSESSMENT — PATIENT HEALTH QUESTIONNAIRE - PHQ9
SUM OF ALL RESPONSES TO PHQ9 QUESTIONS 1 AND 2: 0
1. LITTLE INTEREST OR PLEASURE IN DOING THINGS: NOT AT ALL
2. FEELING DOWN, DEPRESSED OR HOPELESS: NOT AT ALL

## 2025-01-13 NOTE — PATIENT INSTRUCTIONS
Drink 2.5-3.5 liters fluids per day  Low sodium, normal calcium in diet, add lemon  JOSUE in  4 weeks with a urine at the lab, unable to give sample today  Call [phone] to schedule the renal ultrasound  Make sure hematuria cleared up with passing stone  Drop off urine at lab same day you do ultrasound  Follow up 6 weeks  Nurse line [phone]

## 2025-01-13 NOTE — PROGRESS NOTES
Kidney stone    History Of Present Illness  Mr. Kirill arvizu is a 75 yo male presents for kidney stone, passed and brought with him today; Patient of Dr. Villafana, last seen by him on 2/28/23 for erectile dysfunction managed with sildenafil PRN;     In the ER Nov 23, 2024, 6 mm distal right ureteral stone with mild hydro, additional 3 mm bilateral stones noted on CT, passed the stone about 2 weeks ago, since then has felt great, no further hematuria, no dysuria, no urgency or frequency; first kidney stone; no family hx kidney stones; no UTIs, no gross hematuria, just pink when passing stone; up 3 x at night typically but stream is good; stopped the tamsulosin few days back, doesn't feel needs to take any medicine for BPH;     12/12/24 MRI kidney w and wo contrast:   Probably benign kidney cysts. No suspicious renal lesion. CT finding compatible with technique related pseudo enhancement.    11/23/24 CT angio abd/pel w and wo IV constrast:   KIDNEYS and URETERS: 6 mm calculus in the distal right ureter. Mild right hydronephrosis. Additional bilateral right renal calculi measuring up to 3 mm. There is an 11 mm cortical lesion in the lower  pole left kidney which appears to enhance between precontrast and postcontrast images, however, the lesion is not well seen on the precontrast study.    11/23/24 urine micro rbc 11-20/hpf  11/23/24 creatinine 1.15, GFR 67  2/28/23 UA neg  2/28/23 PSA 1.90    AUA BPH 15  JUAN R 14      Past Medical History  He has a past medical history of Diabetes mellitus (Multi), Hypercholesteremia, and Hypertension.    Surgical History  He has a past surgical history that includes Other surgical history (04/29/2022); Other surgical history (04/29/2022); Other surgical history (04/29/2022); and Other surgical history (04/29/2022). Back, foot navel hernia;      Social History  He reports that he has never smoked. He has never used smokeless tobacco. He reports current alcohol use. He reports that he  "does not use drugs.  retired;    Family History  No family history on file. No kidney stones, no urological cancers, only lung cancer;     Allergies  Adhesive tape-silicones    ROS: 12 system review was completed and is negative with the exception of those signs and symptoms noted in the history of present illness: A 12 system review was completed and is negative with the exception of those signs and symptoms noted in the history of present illness.     Exam:  General: in NAD, appears stated age  Head: normocephalic, atraumatic  Respiratory: normal effort, no use of accessory muscles  Cardiovascular: no edema noted  Skin: normal turgor, no rashes  Neurologic: grossly intact, oriented to person/place/time  Psychiatric: mode and affect appropriate    No CVA tenderness bilaterally;     Last Recorded Vitals  Blood pressure 160/83, pulse 74, temperature 36.7 °C (98 °F), height 1.905 m (6' 3\"), weight 114 kg (252 lb).    Lab Results   Component Value Date    PSASCREEN 1.10 07/09/2020    CREATININE 1.15 11/23/2024    HGB 15.1 11/23/2024         ASSESSMENT/PLAN:  Right ureteral stone with hydro, bilateral renal 3 mm non-obstructing stones    Drink 2.5-3.5 liters fluids per day  Low sodium, normal calcium in diet, add lemon  JOSUE in  4 weeks with a urine at the lab, unable to give sample today  Make sure hematuria cleared up with passing stone  Follow up 6 weeks  Nurse line [phone]  Sue Galeano, APRN-CNP    "

## 2025-01-16 LAB
APPEARANCE STONE: NORMAL
COMPN STONE: NORMAL
SPECIMEN WT: 15 MG

## 2025-01-30 DIAGNOSIS — N52.9 MALE ERECTILE DISORDER OF ORGANIC ORIGIN: ICD-10-CM

## 2025-01-30 RX ORDER — SILDENAFIL 100 MG/1
100 TABLET, FILM COATED ORAL DAILY PRN
Qty: 12 TABLET | Refills: 3 | Status: SHIPPED | OUTPATIENT
Start: 2025-01-30

## 2025-01-30 NOTE — TELEPHONE ENCOUNTER
Patient requesting refill on Sildenafil.   Medication added to med list per patient. Patient states that they have been taking it as needed.

## 2025-02-13 ENCOUNTER — APPOINTMENT (OUTPATIENT)
Dept: RADIOLOGY | Facility: HOSPITAL | Age: 75
End: 2025-02-13
Payer: MEDICARE

## 2025-02-19 DIAGNOSIS — E78.5 HYPERLIPIDEMIA ASSOCIATED WITH TYPE 2 DIABETES MELLITUS (MULTI): ICD-10-CM

## 2025-02-19 DIAGNOSIS — E11.69 TYPE 2 DIABETES MELLITUS WITH OTHER SPECIFIED COMPLICATION, WITHOUT LONG-TERM CURRENT USE OF INSULIN: ICD-10-CM

## 2025-02-19 DIAGNOSIS — E11.59 HYPERTENSION ASSOCIATED WITH TYPE 2 DIABETES MELLITUS (MULTI): ICD-10-CM

## 2025-02-19 DIAGNOSIS — I15.2 HYPERTENSION ASSOCIATED WITH TYPE 2 DIABETES MELLITUS (MULTI): ICD-10-CM

## 2025-02-19 DIAGNOSIS — E11.69 HYPERLIPIDEMIA ASSOCIATED WITH TYPE 2 DIABETES MELLITUS (MULTI): ICD-10-CM

## 2025-02-20 RX ORDER — LOSARTAN POTASSIUM 50 MG/1
50 TABLET ORAL DAILY
Qty: 90 TABLET | Refills: 3 | Status: SHIPPED | OUTPATIENT
Start: 2025-02-20 | End: 2026-02-20

## 2025-02-20 RX ORDER — METFORMIN HYDROCHLORIDE 1000 MG/1
1000 TABLET ORAL
Qty: 180 TABLET | Refills: 3 | Status: SHIPPED | OUTPATIENT
Start: 2025-02-20 | End: 2026-02-20

## 2025-02-20 RX ORDER — AMLODIPINE BESYLATE 5 MG/1
5 TABLET ORAL DAILY
Qty: 90 TABLET | Refills: 3 | Status: SHIPPED | OUTPATIENT
Start: 2025-02-20 | End: 2026-02-20

## 2025-02-20 RX ORDER — ATORVASTATIN CALCIUM 40 MG/1
40 TABLET, FILM COATED ORAL NIGHTLY
Qty: 90 TABLET | Refills: 3 | Status: SHIPPED | OUTPATIENT
Start: 2025-02-20 | End: 2026-02-20

## 2025-02-20 RX ORDER — HYDROCHLOROTHIAZIDE 12.5 MG/1
12.5 TABLET ORAL DAILY
Qty: 90 TABLET | Refills: 3 | Status: SHIPPED | OUTPATIENT
Start: 2025-02-20 | End: 2026-02-20

## 2025-02-24 ENCOUNTER — APPOINTMENT (OUTPATIENT)
Dept: UROLOGY | Facility: CLINIC | Age: 75
End: 2025-02-24
Payer: MEDICARE

## 2025-02-27 ENCOUNTER — HOSPITAL ENCOUNTER (OUTPATIENT)
Dept: CARDIOLOGY | Facility: CLINIC | Age: 75
Discharge: HOME | End: 2025-02-27
Payer: MEDICARE

## 2025-02-27 DIAGNOSIS — I25.10 ASHD (ARTERIOSCLEROTIC HEART DISEASE): ICD-10-CM

## 2025-02-27 DIAGNOSIS — R94.31 ABNORMAL EKG: ICD-10-CM

## 2025-02-27 PROCEDURE — 93017 CV STRESS TEST TRACING ONLY: CPT

## 2025-03-06 ENCOUNTER — TELEPHONE (OUTPATIENT)
Dept: CARDIOLOGY | Facility: CLINIC | Age: 75
End: 2025-03-06
Payer: MEDICARE

## 2025-03-06 DIAGNOSIS — R94.31 ABNORMAL EKG: ICD-10-CM

## 2025-03-06 DIAGNOSIS — I25.10 ASHD (ARTERIOSCLEROTIC HEART DISEASE): ICD-10-CM

## 2025-03-06 NOTE — TELEPHONE ENCOUNTER
----- Message from Reese Wiggins sent at 3/5/2025  4:21 PM EST -----  Stress test reviewed and is nondiagnostic secondary to inadequate heart rate achieved.  I would like to have him scheduled for a Lexiscan myocardial perfusion study to better exclude any significant flow-limiting coronary artery disease.  Further recommendations pending these results.  Thanks.

## 2025-03-12 ENCOUNTER — APPOINTMENT (OUTPATIENT)
Dept: PRIMARY CARE | Facility: CLINIC | Age: 75
End: 2025-03-12
Payer: MEDICARE

## 2025-03-12 VITALS
HEART RATE: 79 BPM | BODY MASS INDEX: 31.21 KG/M2 | HEIGHT: 75 IN | DIASTOLIC BLOOD PRESSURE: 74 MMHG | OXYGEN SATURATION: 96 % | TEMPERATURE: 98.3 F | SYSTOLIC BLOOD PRESSURE: 136 MMHG | WEIGHT: 251 LBS

## 2025-03-12 DIAGNOSIS — I15.2 HYPERTENSION ASSOCIATED WITH DIABETES (MULTI): ICD-10-CM

## 2025-03-12 DIAGNOSIS — Z12.5 SCREENING FOR PROSTATE CANCER: ICD-10-CM

## 2025-03-12 DIAGNOSIS — E11.59 HYPERTENSION ASSOCIATED WITH DIABETES (MULTI): ICD-10-CM

## 2025-03-12 DIAGNOSIS — Z13.89 SCREENING FOR BLOOD OR PROTEIN IN URINE: ICD-10-CM

## 2025-03-12 DIAGNOSIS — E58 INADEQUATE INTAKE OF CALCIUM AND VITAMIN D: ICD-10-CM

## 2025-03-12 DIAGNOSIS — Z12.11 COLON CANCER SCREENING: Primary | ICD-10-CM

## 2025-03-12 DIAGNOSIS — E11.9 TYPE 2 DIABETES MELLITUS WITHOUT COMPLICATION, WITHOUT LONG-TERM CURRENT USE OF INSULIN (MULTI): Primary | ICD-10-CM

## 2025-03-12 DIAGNOSIS — Z13.29 SCREENING FOR THYROID DISORDER: ICD-10-CM

## 2025-03-12 DIAGNOSIS — E55.9 INADEQUATE INTAKE OF CALCIUM AND VITAMIN D: ICD-10-CM

## 2025-03-12 DIAGNOSIS — E78.5 HYPERLIPIDEMIA ASSOCIATED WITH TYPE 2 DIABETES MELLITUS (MULTI): ICD-10-CM

## 2025-03-12 DIAGNOSIS — E11.69 HYPERLIPIDEMIA ASSOCIATED WITH TYPE 2 DIABETES MELLITUS (MULTI): ICD-10-CM

## 2025-03-12 DIAGNOSIS — Z12.11 SCREENING FOR COLON CANCER: ICD-10-CM

## 2025-03-12 PROBLEM — Z76.89 ENCOUNTER TO ESTABLISH CARE WITH NEW DOCTOR: Status: RESOLVED | Noted: 2025-01-08 | Resolved: 2025-03-12

## 2025-03-12 PROBLEM — R94.31 ABNORMAL EKG: Status: RESOLVED | Noted: 2025-01-08 | Resolved: 2025-03-12

## 2025-03-12 LAB — POC HEMOGLOBIN A1C: 7.6 % (ref 4.2–6.5)

## 2025-03-12 PROCEDURE — 1159F MED LIST DOCD IN RCRD: CPT | Performed by: INTERNAL MEDICINE

## 2025-03-12 PROCEDURE — 1036F TOBACCO NON-USER: CPT | Performed by: INTERNAL MEDICINE

## 2025-03-12 PROCEDURE — G2211 COMPLEX E/M VISIT ADD ON: HCPCS | Performed by: INTERNAL MEDICINE

## 2025-03-12 PROCEDURE — 4010F ACE/ARB THERAPY RXD/TAKEN: CPT | Performed by: INTERNAL MEDICINE

## 2025-03-12 PROCEDURE — 1160F RVW MEDS BY RX/DR IN RCRD: CPT | Performed by: INTERNAL MEDICINE

## 2025-03-12 PROCEDURE — 1124F ACP DISCUSS-NO DSCNMKR DOCD: CPT | Performed by: INTERNAL MEDICINE

## 2025-03-12 PROCEDURE — 99214 OFFICE O/P EST MOD 30 MIN: CPT | Performed by: INTERNAL MEDICINE

## 2025-03-12 PROCEDURE — 83036 HEMOGLOBIN GLYCOSYLATED A1C: CPT | Performed by: INTERNAL MEDICINE

## 2025-03-12 PROCEDURE — 3078F DIAST BP <80 MM HG: CPT | Performed by: INTERNAL MEDICINE

## 2025-03-12 PROCEDURE — 3075F SYST BP GE 130 - 139MM HG: CPT | Performed by: INTERNAL MEDICINE

## 2025-03-12 PROCEDURE — 3008F BODY MASS INDEX DOCD: CPT | Performed by: INTERNAL MEDICINE

## 2025-03-12 RX ORDER — POLYETHYLENE GLYCOL 3350, SODIUM SULFATE ANHYDROUS, SODIUM BICARBONATE, SODIUM CHLORIDE, POTASSIUM CHLORIDE 236; 22.74; 6.74; 5.86; 2.97 G/4L; G/4L; G/4L; G/4L; G/4L
4 POWDER, FOR SOLUTION ORAL ONCE
Qty: 4000 ML | Refills: 0 | Status: SHIPPED | OUTPATIENT
Start: 2025-03-12 | End: 2025-03-12

## 2025-03-12 ASSESSMENT — ENCOUNTER SYMPTOMS
DEPRESSION: 0
OCCASIONAL FEELINGS OF UNSTEADINESS: 0
LOSS OF SENSATION IN FEET: 0

## 2025-03-12 ASSESSMENT — PATIENT HEALTH QUESTIONNAIRE - PHQ9
SUM OF ALL RESPONSES TO PHQ9 QUESTIONS 1 AND 2: 0
2. FEELING DOWN, DEPRESSED OR HOPELESS: NOT AT ALL
1. LITTLE INTEREST OR PLEASURE IN DOING THINGS: NOT AT ALL

## 2025-03-12 NOTE — H&P (VIEW-ONLY)
Kirill Adam, [facility] 74 y.o. male was seen today for:      Chief Complaint   Patient presents with    Follow-up     Follow up  Following with Cardiology Dr. Feliciano Smith       Kirill Adam   Comes here for follow-up.  He has diabetes.  He has been watching his diet well.  He recently had a back surgery but he is regaining his function.  He is back to his normal activity.  He checks his blood sugar early in the morning which is in between 120 to 130 mg per DL.  His office A1c is 7.6% today.  Patient currently takes metformin.  I wanted to add another oral hypoglycemic agent however patient wanted to watch diet and do further physical exam activity.  Will recheck his blood work in 3 months.  Otherwise he needs his routine blood work.  Medications were sent for refill in the past.  Patient had a Cologuard in 2022.  I suggested he should go for colonoscopy this year.      MEDICAL DECISION MAKING:  - Current co morbidities have been considered.   - All pertinent labs and images were addressed as per medical necessity.    - Also reviewed relevant notes from the specialty consultants.     - Time spent before, during and after office visit, which includes coordination of care counseling was 33 minutes  - Next follow up : June 2025    TODAY'S VISIT  DX:   1. Type 2 diabetes mellitus without complication, without long-term current use of insulin (Multi)  A1c today in the office is 7.6%.    Comprehensive Metabolic Panel    Comprehensive Metabolic Panel      2. Hyperlipidemia associated with type 2 diabetes mellitus (Multi)  Lipid Panel    Lipid Panel      3. Hypertension associated with diabetes (Multi)  CBC and Auto Differential    CBC and Auto Differential      4. Screening for thyroid disorder  Triiodothyronine, Free    TSH with reflex to Free T4 if abnormal    Triiodothyronine, Free    TSH with reflex to Free T4 if abnormal      5. Inadequate intake of calcium and vitamin D  Vitamin D 25-Hydroxy,Total  "(for eval of Vitamin D levels)    Vitamin D 25-Hydroxy,Total (for eval of Vitamin D levels)      6. Screening for blood or protein in urine  Urinalysis with Reflex Microscopic    Urinalysis with Reflex Microscopic      7. Screening for prostate cancer  Prostate Specific Antigen    Prostate Specific Antigen      8. Screening for colon cancer  Colonoscopy Screening; Average Risk Patient             Visit Vitals  /74 (BP Location: Right arm, Patient Position: Sitting)   Pulse 79   Temp 36.8 °C (98.3 °F) (Temporal)   Ht 1.905 m (6' 3\")   Wt 114 kg (251 lb)   SpO2 96% Comment: ra   BMI 31.37 kg/m²   Smoking Status Never   BSA 2.46 m²     Wt Readings from Last 10 Encounters:   03/12/25 114 kg (251 lb)   01/13/25 114 kg (252 lb)   01/08/25 117 kg (258 lb)   12/30/24 117 kg (259 lb)   12/12/24 113 kg (249 lb)   11/22/24 117 kg (259 lb)   08/30/23 103 kg (227 lb)   05/01/23 103 kg (228 lb 2 oz)   03/15/23 112 kg (248 lb)   02/28/23 116 kg (255 lb)       MEDICATIONS:   Current Outpatient Medications   Medication Instructions    amLODIPine (NORVASC) 5 mg, oral, Daily    ascorbic acid (VITAMIN C) 250 mg, Daily    aspirin 81 mg EC tablet     atorvastatin (LIPITOR) 40 mg, oral, Nightly    cholecalciferol, vitamin D3, 75 mcg (3,000 unit) tablet Take by mouth.    hydroCHLOROthiazide (MICROZIDE) 12.5 mg, oral, Daily    losartan (COZAAR) 50 mg, oral, Daily    metFORMIN (GLUCOPHAGE) 1,000 mg, oral, 2 times daily (morning and late afternoon), Take with food.    multivitamin tablet 1 tablet, Daily RT    sildenafil (VIAGRA) 100 mg, oral, Daily PRN       Review of Systems   Constitutional:  Negative for activity change and fever.   HENT:  Negative for hearing loss, nosebleeds and tinnitus.    Eyes:  Negative for redness.   Respiratory:  Negative for chest tightness and stridor.    Cardiovascular:  Negative for chest pain, palpitations and leg swelling.   Gastrointestinal:  Negative for blood in stool.   Endocrine: Negative for cold " intolerance.   Genitourinary:  Negative for hematuria.   Musculoskeletal:  Negative for joint swelling.   Skin:  Negative for rash.   Neurological:  Negative for speech difficulty and light-headedness.   Psychiatric/Behavioral:  Negative for behavioral problems.       Physical Exam  Constitutional:       General: She is not in acute distress.     Appearance: Normal appearance.   HENT:      Head: Normocephalic.      Right Ear: Tympanic membrane normal.      Left Ear: Tympanic membrane normal.      Mouth/Throat:      Mouth: Mucous membranes are moist.   Cardiovascular:      Rate and Rhythm: Normal rate and regular rhythm.      Heart sounds: No murmur heard.  Pulmonary:      Effort: No respiratory distress.   Abdominal:      Palpations: Abdomen is soft.   Musculoskeletal:      Cervical back: Neck supple.      Right lower leg: No edema.      Left lower leg: No edema.   Skin:     Findings: No rash.   Neurological:      General: No focal deficit present.      Mental Status: She is alert and oriented to person, place, and time.   Psychiatric:         Mood and Affect: Mood normal.      RECENT LABS:  Lab Results   Component Value Date    WBC 11.7 (H) 11/23/2024    HGB 15.1 11/23/2024    HCT 45.3 11/23/2024     11/23/2024    CHOL 148 02/08/2023    TRIG 186 (H) 02/08/2023    HDL 37.3 (A) 02/08/2023    ALT 16 11/23/2024    AST 16 11/23/2024     11/23/2024    K 3.5 11/23/2024     11/23/2024    CREATININE 1.15 11/23/2024    BUN 21 11/23/2024    CO2 23 11/23/2024    TSH 2.49 02/08/2023    PSA 1.90 02/08/2023     Lab Results   Component Value Date    GLUCOSE 118 (H) 11/23/2024    CALCIUM 9.5 11/23/2024     11/23/2024    K 3.5 11/23/2024    CO2 23 11/23/2024     11/23/2024    BUN 21 11/23/2024    CREATININE 1.15 11/23/2024        Lab Results   Component Value Date    CREATININE 1.15 11/23/2024     Lab Results   Component Value Date    PSA 1.90 02/08/2023    PSA 1.40 09/13/2021         P.S: This note  was completed using Dragon voice recognition technology and may include unintended errors with respect to translation of words, typographical errors or grammar errors which may not have been identified while finalizing the chart.

## 2025-03-12 NOTE — PROGRESS NOTES
Kirill Adam, [facility] 74 y.o. male was seen today for:      Chief Complaint   Patient presents with    Follow-up     Follow up  Following with Cardiology Dr. Feliciano Smith       Kirill Adam   Comes here for follow-up.  He has diabetes.  He has been watching his diet well.  He recently had a back surgery but he is regaining his function.  He is back to his normal activity.  He checks his blood sugar early in the morning which is in between 120 to 130 mg per DL.  His office A1c is 7.6% today.  Patient currently takes metformin.  I wanted to add another oral hypoglycemic agent however patient wanted to watch diet and do further physical exam activity.  Will recheck his blood work in 3 months.  Otherwise he needs his routine blood work.  Medications were sent for refill in the past.  Patient had a Cologuard in 2022.  I suggested he should go for colonoscopy this year.      MEDICAL DECISION MAKING:  - Current co morbidities have been considered.   - All pertinent labs and images were addressed as per medical necessity.    - Also reviewed relevant notes from the specialty consultants.     - Time spent before, during and after office visit, which includes coordination of care counseling was 33 minutes  - Next follow up : June 2025    TODAY'S VISIT  DX:   1. Type 2 diabetes mellitus without complication, without long-term current use of insulin (Multi)  A1c today in the office is 7.6%.    Comprehensive Metabolic Panel    Comprehensive Metabolic Panel      2. Hyperlipidemia associated with type 2 diabetes mellitus (Multi)  Lipid Panel    Lipid Panel      3. Hypertension associated with diabetes (Multi)  CBC and Auto Differential    CBC and Auto Differential      4. Screening for thyroid disorder  Triiodothyronine, Free    TSH with reflex to Free T4 if abnormal    Triiodothyronine, Free    TSH with reflex to Free T4 if abnormal      5. Inadequate intake of calcium and vitamin D  Vitamin D 25-Hydroxy,Total  "(for eval of Vitamin D levels)    Vitamin D 25-Hydroxy,Total (for eval of Vitamin D levels)      6. Screening for blood or protein in urine  Urinalysis with Reflex Microscopic    Urinalysis with Reflex Microscopic      7. Screening for prostate cancer  Prostate Specific Antigen    Prostate Specific Antigen      8. Screening for colon cancer  Colonoscopy Screening; Average Risk Patient             Visit Vitals  /74 (BP Location: Right arm, Patient Position: Sitting)   Pulse 79   Temp 36.8 °C (98.3 °F) (Temporal)   Ht 1.905 m (6' 3\")   Wt 114 kg (251 lb)   SpO2 96% Comment: ra   BMI 31.37 kg/m²   Smoking Status Never   BSA 2.46 m²     Wt Readings from Last 10 Encounters:   03/12/25 114 kg (251 lb)   01/13/25 114 kg (252 lb)   01/08/25 117 kg (258 lb)   12/30/24 117 kg (259 lb)   12/12/24 113 kg (249 lb)   11/22/24 117 kg (259 lb)   08/30/23 103 kg (227 lb)   05/01/23 103 kg (228 lb 2 oz)   03/15/23 112 kg (248 lb)   02/28/23 116 kg (255 lb)       MEDICATIONS:   Current Outpatient Medications   Medication Instructions    amLODIPine (NORVASC) 5 mg, oral, Daily    ascorbic acid (VITAMIN C) 250 mg, Daily    aspirin 81 mg EC tablet     atorvastatin (LIPITOR) 40 mg, oral, Nightly    cholecalciferol, vitamin D3, 75 mcg (3,000 unit) tablet Take by mouth.    hydroCHLOROthiazide (MICROZIDE) 12.5 mg, oral, Daily    losartan (COZAAR) 50 mg, oral, Daily    metFORMIN (GLUCOPHAGE) 1,000 mg, oral, 2 times daily (morning and late afternoon), Take with food.    multivitamin tablet 1 tablet, Daily RT    sildenafil (VIAGRA) 100 mg, oral, Daily PRN       Review of Systems   Constitutional:  Negative for activity change and fever.   HENT:  Negative for hearing loss, nosebleeds and tinnitus.    Eyes:  Negative for redness.   Respiratory:  Negative for chest tightness and stridor.    Cardiovascular:  Negative for chest pain, palpitations and leg swelling.   Gastrointestinal:  Negative for blood in stool.   Endocrine: Negative for cold " intolerance.   Genitourinary:  Negative for hematuria.   Musculoskeletal:  Negative for joint swelling.   Skin:  Negative for rash.   Neurological:  Negative for speech difficulty and light-headedness.   Psychiatric/Behavioral:  Negative for behavioral problems.       Physical Exam  Constitutional:       General: She is not in acute distress.     Appearance: Normal appearance.   HENT:      Head: Normocephalic.      Right Ear: Tympanic membrane normal.      Left Ear: Tympanic membrane normal.      Mouth/Throat:      Mouth: Mucous membranes are moist.   Cardiovascular:      Rate and Rhythm: Normal rate and regular rhythm.      Heart sounds: No murmur heard.  Pulmonary:      Effort: No respiratory distress.   Abdominal:      Palpations: Abdomen is soft.   Musculoskeletal:      Cervical back: Neck supple.      Right lower leg: No edema.      Left lower leg: No edema.   Skin:     Findings: No rash.   Neurological:      General: No focal deficit present.      Mental Status: She is alert and oriented to person, place, and time.   Psychiatric:         Mood and Affect: Mood normal.      RECENT LABS:  Lab Results   Component Value Date    WBC 11.7 (H) 11/23/2024    HGB 15.1 11/23/2024    HCT 45.3 11/23/2024     11/23/2024    CHOL 148 02/08/2023    TRIG 186 (H) 02/08/2023    HDL 37.3 (A) 02/08/2023    ALT 16 11/23/2024    AST 16 11/23/2024     11/23/2024    K 3.5 11/23/2024     11/23/2024    CREATININE 1.15 11/23/2024    BUN 21 11/23/2024    CO2 23 11/23/2024    TSH 2.49 02/08/2023    PSA 1.90 02/08/2023     Lab Results   Component Value Date    GLUCOSE 118 (H) 11/23/2024    CALCIUM 9.5 11/23/2024     11/23/2024    K 3.5 11/23/2024    CO2 23 11/23/2024     11/23/2024    BUN 21 11/23/2024    CREATININE 1.15 11/23/2024        Lab Results   Component Value Date    CREATININE 1.15 11/23/2024     Lab Results   Component Value Date    PSA 1.90 02/08/2023    PSA 1.40 09/13/2021         P.S: This note  was completed using Dragon voice recognition technology and may include unintended errors with respect to translation of words, typographical errors or grammar errors which may not have been identified while finalizing the chart.

## 2025-03-19 ENCOUNTER — ANESTHESIA EVENT (OUTPATIENT)
Dept: GASTROENTEROLOGY | Facility: EXTERNAL LOCATION | Age: 75
End: 2025-03-19

## 2025-04-02 ENCOUNTER — APPOINTMENT (OUTPATIENT)
Dept: GASTROENTEROLOGY | Facility: EXTERNAL LOCATION | Age: 75
End: 2025-04-02
Payer: MEDICARE

## 2025-04-02 ENCOUNTER — ANESTHESIA (OUTPATIENT)
Dept: GASTROENTEROLOGY | Facility: EXTERNAL LOCATION | Age: 75
End: 2025-04-02

## 2025-04-02 VITALS
SYSTOLIC BLOOD PRESSURE: 147 MMHG | OXYGEN SATURATION: 96 % | HEART RATE: 60 BPM | RESPIRATION RATE: 21 BRPM | TEMPERATURE: 97.7 F | DIASTOLIC BLOOD PRESSURE: 88 MMHG

## 2025-04-02 DIAGNOSIS — Z12.11 SCREENING FOR COLON CANCER: ICD-10-CM

## 2025-04-02 PROCEDURE — 45385 COLONOSCOPY W/LESION REMOVAL: CPT | Performed by: STUDENT IN AN ORGANIZED HEALTH CARE EDUCATION/TRAINING PROGRAM

## 2025-04-02 PROCEDURE — 45380 COLONOSCOPY AND BIOPSY: CPT | Performed by: STUDENT IN AN ORGANIZED HEALTH CARE EDUCATION/TRAINING PROGRAM

## 2025-04-02 PROCEDURE — 45390 COLONOSCOPY W/RESECTION: CPT | Performed by: STUDENT IN AN ORGANIZED HEALTH CARE EDUCATION/TRAINING PROGRAM

## 2025-04-02 RX ORDER — PROPOFOL 10 MG/ML
INJECTION, EMULSION INTRAVENOUS AS NEEDED
Status: DISCONTINUED | OUTPATIENT
Start: 2025-04-02 | End: 2025-04-02

## 2025-04-02 RX ORDER — ONDANSETRON HYDROCHLORIDE 2 MG/ML
4 INJECTION, SOLUTION INTRAVENOUS ONCE AS NEEDED
Status: DISCONTINUED | OUTPATIENT
Start: 2025-04-02 | End: 2025-04-03 | Stop reason: HOSPADM

## 2025-04-02 RX ORDER — LIDOCAINE HYDROCHLORIDE 20 MG/ML
INJECTION, SOLUTION INFILTRATION; PERINEURAL AS NEEDED
Status: DISCONTINUED | OUTPATIENT
Start: 2025-04-02 | End: 2025-04-02

## 2025-04-02 RX ORDER — SODIUM CHLORIDE 9 MG/ML
INJECTION, SOLUTION INTRAVENOUS CONTINUOUS PRN
Status: DISCONTINUED | OUTPATIENT
Start: 2025-04-02 | End: 2025-04-02

## 2025-04-02 RX ADMIN — SODIUM CHLORIDE: 9 INJECTION, SOLUTION INTRAVENOUS at 10:19

## 2025-04-02 RX ADMIN — PROPOFOL 50 MG: 10 INJECTION, EMULSION INTRAVENOUS at 10:38

## 2025-04-02 RX ADMIN — PROPOFOL 50 MG: 10 INJECTION, EMULSION INTRAVENOUS at 10:51

## 2025-04-02 RX ADMIN — PROPOFOL 100 MG: 10 INJECTION, EMULSION INTRAVENOUS at 10:19

## 2025-04-02 RX ADMIN — PROPOFOL 50 MG: 10 INJECTION, EMULSION INTRAVENOUS at 10:46

## 2025-04-02 RX ADMIN — PROPOFOL 50 MG: 10 INJECTION, EMULSION INTRAVENOUS at 10:59

## 2025-04-02 RX ADMIN — PROPOFOL 50 MG: 10 INJECTION, EMULSION INTRAVENOUS at 10:28

## 2025-04-02 RX ADMIN — PROPOFOL 50 MG: 10 INJECTION, EMULSION INTRAVENOUS at 10:24

## 2025-04-02 RX ADMIN — LIDOCAINE HYDROCHLORIDE 2.5 ML: 20 INJECTION, SOLUTION INFILTRATION; PERINEURAL at 10:19

## 2025-04-02 RX ADMIN — PROPOFOL 50 MG: 10 INJECTION, EMULSION INTRAVENOUS at 11:06

## 2025-04-02 RX ADMIN — PROPOFOL 50 MG: 10 INJECTION, EMULSION INTRAVENOUS at 11:12

## 2025-04-02 RX ADMIN — PROPOFOL 50 MG: 10 INJECTION, EMULSION INTRAVENOUS at 10:42

## 2025-04-02 RX ADMIN — PROPOFOL 50 MG: 10 INJECTION, EMULSION INTRAVENOUS at 10:33

## 2025-04-02 SDOH — HEALTH STABILITY: MENTAL HEALTH: CURRENT SMOKER: 0

## 2025-04-02 ASSESSMENT — COLUMBIA-SUICIDE SEVERITY RATING SCALE - C-SSRS
2. HAVE YOU ACTUALLY HAD ANY THOUGHTS OF KILLING YOURSELF?: NO
6. HAVE YOU EVER DONE ANYTHING, STARTED TO DO ANYTHING, OR PREPARED TO DO ANYTHING TO END YOUR LIFE?: NO
1. IN THE PAST MONTH, HAVE YOU WISHED YOU WERE DEAD OR WISHED YOU COULD GO TO SLEEP AND NOT WAKE UP?: NO

## 2025-04-02 ASSESSMENT — PAIN - FUNCTIONAL ASSESSMENT
PAIN_FUNCTIONAL_ASSESSMENT: 0-10

## 2025-04-02 ASSESSMENT — PAIN SCALES - GENERAL
PAINLEVEL_OUTOF10: 0 - NO PAIN
PAINLEVEL_OUTOF10: 0 - NO PAIN
PAIN_LEVEL: 0
PAINLEVEL_OUTOF10: 0 - NO PAIN
PAINLEVEL_OUTOF10: 0 - NO PAIN

## 2025-04-02 NOTE — DISCHARGE INSTRUCTIONS

## 2025-04-02 NOTE — ANESTHESIA POSTPROCEDURE EVALUATION
Patient: Kirill Adam    Procedure Summary       Date: 04/02/25 Room / Location: [facility]    Anesthesia Start: 1017 Anesthesia Stop:     Procedure: COLONOSCOPY Diagnosis: Screening for colon cancer    Scheduled Providers: Hiram Marina MD Responsible Provider: KEKE Dowell    Anesthesia Type: MAC ASA Status: 3            Anesthesia Type: MAC    Vitals Value Taken Time   /79 04/02/25 1121   Temp 36.5 °C (97.7 °F) 04/02/25 1121   Pulse 64 04/02/25 1121   Resp 12 04/02/25 1121   SpO2 96 % 04/02/25 1121       Anesthesia Post Evaluation    Patient location during evaluation: bedside  Patient participation: complete - patient participated  Level of consciousness: awake  Pain score: 0  Pain management: adequate  Airway patency: patent  Cardiovascular status: acceptable  Respiratory status: acceptable  Hydration status: acceptable  Postoperative Nausea and Vomiting: none        No notable events documented.

## 2025-04-02 NOTE — Clinical Note
EMR of sigmoid colon polyp @20cm performed by Dr. Marina with injection of 1 ml Blue Eye submucosal agent.

## 2025-04-02 NOTE — SIGNIFICANT EVENT
Physician at bedside to discuss procedure results with patient.  Family updated at bedside  Tolerating PO fluids

## 2025-04-02 NOTE — ANESTHESIA PREPROCEDURE EVALUATION
Patient: Kirill Adam    Procedure Information       Date/Time: 04/02/25 0940    Scheduled providers: Hiram Marina MD    Procedure: COLONOSCOPY    Location: [facility]            Relevant Problems   Anesthesia (within normal limits)      Cardiac   (+) ASHD (arteriosclerotic heart disease)   (+) Hyperlipidemia associated with type 2 diabetes mellitus   (+) Hypertension associated with diabetes      Pulmonary (within normal limits)      Neuro (within normal limits)      GI (within normal limits)      /Renal (within normal limits)      Liver (within normal limits)      Endocrine   (+) Type 2 diabetes mellitus without complication, without long-term current use of insulin      Hematology (within normal limits)      Musculoskeletal (within normal limits)      HEENT (within normal limits)      ID (within normal limits)      Skin (within normal limits)      GYN (within normal limits)       Clinical information reviewed:   Tobacco  Allergies  Meds  Problems  Med Hx  Surg Hx   Fam Hx          NPO Detail:  No data recorded     Physical Exam    Airway  Mallampati: II  TM distance: >3 FB  Neck ROM: full     Cardiovascular   Rhythm: regular  Rate: normal     Dental    Pulmonary   Breath sounds clear to auscultation     Abdominal   Abdomen: soft  Bowel sounds: normal     Other findings: Multiple teeth broke and decayed; zero loose per pt  FSBS 148 this am per Pt          Anesthesia Plan    History of general anesthesia?: yes  History of complications of general anesthesia?: no    ASA 3     MAC     The patient is not a current smoker.  Patient was not previously instructed to abstain from smoking on day of procedure.  Education provided regarding risk of obstructive sleep apnea.  intravenous induction   Anesthetic plan and risks discussed with patient.    Plan discussed with CRNA.

## 2025-04-07 ENCOUNTER — HOSPITAL ENCOUNTER (OUTPATIENT)
Dept: RADIOLOGY | Facility: HOSPITAL | Age: 75
Discharge: HOME | End: 2025-04-07
Payer: MEDICARE

## 2025-04-07 ENCOUNTER — HOSPITAL ENCOUNTER (OUTPATIENT)
Dept: CARDIOLOGY | Facility: HOSPITAL | Age: 75
Discharge: HOME | End: 2025-04-07
Payer: MEDICARE

## 2025-04-07 DIAGNOSIS — R94.31 ABNORMAL EKG: ICD-10-CM

## 2025-04-07 DIAGNOSIS — I25.10 ASHD (ARTERIOSCLEROTIC HEART DISEASE): ICD-10-CM

## 2025-04-07 PROCEDURE — 93018 CV STRESS TEST I&R ONLY: CPT | Performed by: INTERNAL MEDICINE

## 2025-04-07 PROCEDURE — A9502 TC99M TETROFOSMIN: HCPCS | Performed by: INTERNAL MEDICINE

## 2025-04-07 PROCEDURE — 3430000001 HC RX 343 DIAGNOSTIC RADIOPHARMACEUTICALS: Performed by: INTERNAL MEDICINE

## 2025-04-07 PROCEDURE — 78452 HT MUSCLE IMAGE SPECT MULT: CPT

## 2025-04-07 PROCEDURE — 93017 CV STRESS TEST TRACING ONLY: CPT

## 2025-04-07 PROCEDURE — 2500000004 HC RX 250 GENERAL PHARMACY W/ HCPCS (ALT 636 FOR OP/ED): Performed by: INTERNAL MEDICINE

## 2025-04-07 PROCEDURE — 93016 CV STRESS TEST SUPVJ ONLY: CPT | Performed by: INTERNAL MEDICINE

## 2025-04-07 PROCEDURE — 78452 HT MUSCLE IMAGE SPECT MULT: CPT | Performed by: STUDENT IN AN ORGANIZED HEALTH CARE EDUCATION/TRAINING PROGRAM

## 2025-04-07 RX ORDER — REGADENOSON 0.08 MG/ML
0.4 INJECTION, SOLUTION INTRAVENOUS ONCE
Status: COMPLETED | OUTPATIENT
Start: 2025-04-07 | End: 2025-04-07

## 2025-04-07 RX ADMIN — REGADENOSON 0.4 MG: 0.08 INJECTION, SOLUTION INTRAVENOUS at 10:09

## 2025-04-07 RX ADMIN — TETROFOSMIN 31 MILLICURIE: 0.23 INJECTION, POWDER, LYOPHILIZED, FOR SOLUTION INTRAVENOUS at 10:10

## 2025-04-07 RX ADMIN — TETROFOSMIN 10.1 MILLICURIE: 0.23 INJECTION, POWDER, LYOPHILIZED, FOR SOLUTION INTRAVENOUS at 08:40

## 2025-04-08 ENCOUNTER — TELEPHONE (OUTPATIENT)
Dept: CARDIOLOGY | Facility: CLINIC | Age: 75
End: 2025-04-08
Payer: MEDICARE

## 2025-04-08 NOTE — TELEPHONE ENCOUNTER
Patient's wife called back regarding stress test results.  Normal results were given and patient and wife verbalized understanding.

## 2025-04-08 NOTE — TELEPHONE ENCOUNTER
----- Message from Reese Wiggins sent at 4/7/2025  5:13 PM EDT -----  Stress test reviewed and is normal.  Normal blood flow to the heart.  Normal pumping function.  Nothing to suggest any significant heart artery blockage.  Continue same and follow-up as scheduled.  Thanks.

## 2025-04-08 NOTE — TELEPHONE ENCOUNTER
Patient called and l/m for patient to call us back for normal stress test results per Dr. Reese Wiggins

## 2025-04-10 LAB
LABORATORY COMMENT REPORT: NORMAL
PATH REPORT.FINAL DX SPEC: NORMAL
PATH REPORT.GROSS SPEC: NORMAL
PATH REPORT.TOTAL CANCER: NORMAL

## 2025-04-24 ENCOUNTER — TELEPHONE (OUTPATIENT)
Dept: GASTROENTEROLOGY | Facility: CLINIC | Age: 75
End: 2025-04-24
Payer: MEDICARE

## 2025-05-05 ENCOUNTER — TELEPHONE (OUTPATIENT)
Dept: GASTROENTEROLOGY | Facility: CLINIC | Age: 75
End: 2025-05-05

## 2025-05-05 DIAGNOSIS — Z12.11 SCREENING FOR COLON CANCER: Primary | ICD-10-CM

## 2025-05-05 RX ORDER — SODIUM, POTASSIUM,MAG SULFATES 17.5-3.13G
SOLUTION, RECONSTITUTED, ORAL ORAL
Qty: 354 ML | Refills: 0 | Status: SHIPPED | OUTPATIENT
Start: 2025-05-05 | End: 2025-05-06 | Stop reason: HOSPADM

## 2025-05-05 NOTE — TELEPHONE ENCOUNTER
Called patient back, only number listed for both patient and spouse. Left message for patient to call so I can see how I can help.

## 2025-05-05 NOTE — TELEPHONE ENCOUNTER
Patient returned my call, patient is very upset as is spouse and was going to cancel, I explained to patient that their prep was called in this morning and it would be best if they kept their appointment with Dr Alfaro as Dr Marina had wanted Dr Alfaro to do this particular procedure.  I sent them instructions for prep, and informed [facility] they would be keeping their appointment ( patient had made an electronic cancel- but it didn't go thru ).

## 2025-05-05 NOTE — TELEPHONE ENCOUNTER
Pt spouse called clinic, they have a procedure with dr. Alfaro tomorrow the 6th, they are having a hard time getting ahold of Angela, she says they have been calling since last week no return call yet they are upset. They would like to cancel this and schedule it with Dr. Marina pls contact pt if possible

## 2025-05-06 ENCOUNTER — ANESTHESIA (OUTPATIENT)
Dept: GASTROENTEROLOGY | Facility: HOSPITAL | Age: 75
End: 2025-05-06
Payer: MEDICARE

## 2025-05-06 ENCOUNTER — ANESTHESIA EVENT (OUTPATIENT)
Dept: GASTROENTEROLOGY | Facility: HOSPITAL | Age: 75
End: 2025-05-06
Payer: MEDICARE

## 2025-05-06 ENCOUNTER — APPOINTMENT (OUTPATIENT)
Dept: GASTROENTEROLOGY | Facility: HOSPITAL | Age: 75
End: 2025-05-06
Payer: MEDICARE

## 2025-05-06 VITALS
WEIGHT: 249 LBS | TEMPERATURE: 96.8 F | HEIGHT: 74 IN | HEART RATE: 70 BPM | RESPIRATION RATE: 16 BRPM | BODY MASS INDEX: 31.95 KG/M2 | DIASTOLIC BLOOD PRESSURE: 72 MMHG | SYSTOLIC BLOOD PRESSURE: 134 MMHG | OXYGEN SATURATION: 96 %

## 2025-05-06 DIAGNOSIS — K63.5 COLON POLYP: ICD-10-CM

## 2025-05-06 LAB — GLUCOSE BLD MANUAL STRIP-MCNC: 116 MG/DL (ref 74–99)

## 2025-05-06 PROCEDURE — 7100000010 HC PHASE TWO TIME - EACH INCREMENTAL 1 MINUTE

## 2025-05-06 PROCEDURE — A45390 PR COLONOSCOPY FLX W/ENDOSCOPIC MUCOSAL RESECTION: Performed by: ANESTHESIOLOGIST ASSISTANT

## 2025-05-06 PROCEDURE — A45390 PR COLONOSCOPY FLX W/ENDOSCOPIC MUCOSAL RESECTION: Performed by: ANESTHESIOLOGY

## 2025-05-06 PROCEDURE — 2500000004 HC RX 250 GENERAL PHARMACY W/ HCPCS (ALT 636 FOR OP/ED): Mod: JZ | Performed by: ANESTHESIOLOGIST ASSISTANT

## 2025-05-06 PROCEDURE — 82947 ASSAY GLUCOSE BLOOD QUANT: CPT

## 2025-05-06 PROCEDURE — 45390 COLONOSCOPY W/RESECTION: CPT | Performed by: STUDENT IN AN ORGANIZED HEALTH CARE EDUCATION/TRAINING PROGRAM

## 2025-05-06 PROCEDURE — 7100000009 HC PHASE TWO TIME - INITIAL BASE CHARGE

## 2025-05-06 PROCEDURE — 99100 ANES PT EXTEME AGE<1 YR&>70: CPT | Performed by: ANESTHESIOLOGY

## 2025-05-06 PROCEDURE — 2720000007 HC OR 272 NO HCPCS

## 2025-05-06 PROCEDURE — 3700000001 HC GENERAL ANESTHESIA TIME - INITIAL BASE CHARGE

## 2025-05-06 PROCEDURE — 3700000002 HC GENERAL ANESTHESIA TIME - EACH INCREMENTAL 1 MINUTE

## 2025-05-06 RX ORDER — PROPOFOL 10 MG/ML
INJECTION, EMULSION INTRAVENOUS AS NEEDED
Status: DISCONTINUED | OUTPATIENT
Start: 2025-05-06 | End: 2025-05-06

## 2025-05-06 RX ORDER — SODIUM CHLORIDE, SODIUM LACTATE, POTASSIUM CHLORIDE, CALCIUM CHLORIDE 600; 310; 30; 20 MG/100ML; MG/100ML; MG/100ML; MG/100ML
INJECTION, SOLUTION INTRAVENOUS CONTINUOUS PRN
Status: DISCONTINUED | OUTPATIENT
Start: 2025-05-06 | End: 2025-05-06

## 2025-05-06 RX ADMIN — PROPOFOL 20 MG: 10 INJECTION, EMULSION INTRAVENOUS at 15:11

## 2025-05-06 RX ADMIN — PROPOFOL 50 MG: 10 INJECTION, EMULSION INTRAVENOUS at 15:09

## 2025-05-06 RX ADMIN — PROPOFOL 150 MCG/KG/MIN: 10 INJECTION, EMULSION INTRAVENOUS at 15:10

## 2025-05-06 RX ADMIN — SODIUM CHLORIDE, POTASSIUM CHLORIDE, SODIUM LACTATE AND CALCIUM CHLORIDE: 600; 310; 30; 20 INJECTION, SOLUTION INTRAVENOUS at 15:06

## 2025-05-06 SDOH — HEALTH STABILITY: MENTAL HEALTH: CURRENT SMOKER: 0

## 2025-05-06 ASSESSMENT — PAIN SCALES - GENERAL
PAINLEVEL_OUTOF10: 0 - NO PAIN
PAIN_LEVEL: 0
PAINLEVEL_OUTOF10: 0 - NO PAIN

## 2025-05-06 ASSESSMENT — PAIN - FUNCTIONAL ASSESSMENT
PAIN_FUNCTIONAL_ASSESSMENT: 0-10

## 2025-05-06 ASSESSMENT — COLUMBIA-SUICIDE SEVERITY RATING SCALE - C-SSRS
1. IN THE PAST MONTH, HAVE YOU WISHED YOU WERE DEAD OR WISHED YOU COULD GO TO SLEEP AND NOT WAKE UP?: NO
6. HAVE YOU EVER DONE ANYTHING, STARTED TO DO ANYTHING, OR PREPARED TO DO ANYTHING TO END YOUR LIFE?: NO
2. HAVE YOU ACTUALLY HAD ANY THOUGHTS OF KILLING YOURSELF?: NO

## 2025-05-06 NOTE — ANESTHESIA PREPROCEDURE EVALUATION
Patient: Kirill Adam    Procedure Information       Date/Time: 05/06/25 1430    Scheduled providers: Be Alfaro MD    Procedure: COLONOSCOPY    Location: [facility]            Relevant Problems   Cardiac   (+) ASHD (arteriosclerotic heart disease)   (+) Hyperlipidemia associated with type 2 diabetes mellitus   (+) Hypertension associated with diabetes      Endocrine   (+) Type 2 diabetes mellitus without complication, without long-term current use of insulin       Clinical information reviewed:   Tobacco  Allergies  Meds  Problems  Med Hx  Surg Hx   Fam Hx  Soc   Hx        NPO Detail:  NPO/Void Status  Carbohydrate Drink Given Prior to Surgery? : N  Date of Last Liquid: 05/06/25  Time of Last Liquid: 1030  Date of Last Solid: 05/05/25  Time of Last Solid: 0700  Last Intake Type: Clear fluids  Time of Last Void: 1130         Physical Exam    Airway  Mallampati: III  TM distance: >3 FB  Neck ROM: full  Mouth opening: 3 or more finger widths     Cardiovascular - normal exam  Rhythm: regular  Rate: normal     Dental   Comments: Poor dentition     Pulmonary - normal examBreath sounds clear to auscultation     Abdominal (+) obese  Abdomen: soft  Bowel sounds: normal           Anesthesia Plan    History of general anesthesia?: yes  History of complications of general anesthesia?: no    ASA 3     MAC and general   (MAC vs TIVA)  The patient is not a current smoker.  Patient was previously instructed to abstain from smoking on day of procedure.  Patient did not smoke on day of procedure.  Education provided regarding risk of obstructive sleep apnea.  intravenous induction   Postoperative pain plan includes opioids.  Anesthetic plan and risks discussed with patient.  Use of blood products discussed with patient who consented to blood products.    Plan discussed with CAA.

## 2025-05-06 NOTE — H&P
Procedure H&P    Patient Profile-Procedures  Name Kirill Adam  Date of Birth 1950  MRN [id number]  Address   [address]   [facility] [id number]   [facility] [address]    Primary Phone Number [phone]  Secondary Phone Number    Patricio Burden    Procedure(s):  Procedures: Colonoscopy  Primary contact name and number   Extended Emergency Contact Information  Primary Emergency Contact: Misa Adam  Home Phone: [phone]  Relation: Spouse    General Health  Weight   Vitals:    05/06/25 1333   Weight: 113 kg (249 lb)     BMI Body mass index is 31.97 kg/m².    Allergies  RX Allergies[1]    Past Medical History   Medical History[2]    Provider assessment  Diagnosis: polyp EMR  Medication Reviewed - yes  Prior to Admission medications    Medication Sig Start Date End Date Taking? Authorizing Provider   amLODIPine (Norvasc) 5 mg tablet Take 1 tablet (5 mg) by mouth once daily. 2/20/25 2/20/26 Yes Patricio Bryson MD   ascorbic acid (Vitamin C) 250 mg tablet Take 1 tablet (250 mg) by mouth once daily.   Yes Historical Provider, MD   aspirin 81 mg EC tablet    Yes Historical Provider, MD   atorvastatin (Lipitor) 40 mg tablet Take 1 tablet (40 mg) by mouth once daily at bedtime. 2/20/25 2/20/26 Yes Patricio Bryson MD   cholecalciferol, vitamin D3, 75 mcg (3,000 unit) tablet Take by mouth.   Yes Historical Provider, MD   hydroCHLOROthiazide (Microzide) 12.5 mg tablet Take 1 tablet (12.5 mg) by mouth once daily. 2/20/25 2/20/26 Yes Patricio Bryson MD   losartan (Cozaar) 50 mg tablet Take 1 tablet (50 mg) by mouth once daily. 2/20/25 2/20/26 Yes Patricio Bryson MD   metFORMIN (Glucophage) 1,000 mg tablet Take 1 tablet (1,000 mg) by mouth 2 times daily (morning and late afternoon). Take with food. 2/20/25 2/20/26 Yes Patricio Bryson MD   multivitamin tablet Take 1 tablet by mouth once daily.   Yes Historical Provider, MD   sildenafil (Viagra) 100 mg tablet Take 1 tablet (100 mg) by mouth once  daily as needed for erectile dysfunction. 1/30/25  Yes Patricio Bryson MD   sodium,potassium,mag sulfates (Suprep) 17.5-3.13-1.6 gram solution Take one bottle twice as directed by the prep instructions 5/5/25  Yes Be Alfaro MD       Physical Exam  Vitals:    05/06/25 1333   BP: 144/84   Pulse: 75   Resp: 18   Temp: 36.6 °C (97.9 °F)   SpO2: 94%        General: A&Ox3, NAD.  HEENT: AT/NC.   CV: RRR. No murmur.  Resp: CTA bilaterally. No wheezing, rhonchi or rales.   GI: Soft, NT/ND. BSx4.  Extrem: No edema. Pulses intact.  Neuro: No focal deficits.   Psych: Normal mood and affect.      Procedure Plan - pre-procedural (re)assesment completed by physician:  discharge/transfer patient when discharge criteria met    Be Alfaro MD  5/6/2025 2:46 PM           [1]   Allergies  Allergen Reactions    Adhesive Tape-Silicones Hives   [2]   Past Medical History:  Diagnosis Date    Diabetes mellitus (Multi)     Hypercholesteremia     Hypertension

## 2025-05-06 NOTE — DISCHARGE INSTRUCTIONS
Patient Instructions after an Endoscopy      Post-procedure recommendations:  - The patient will be observed post-procedure, until all discharge criteria are met.   - Discharge the patient to home with family member/escort.  - Resume previous diet.  - Continue present medications.  - Observe patient's clinical course following today's procedure with therapeutic intervention.   - Watch for bleeding, perforation, and infection.   - Follow-up with referring physician.   - Return to primary care physician.  - The patient has a contact number available for  emergencies.  The signs and symptoms of potential delayed complications were discussed with the patient.  Return to normal activities tomorrow.  Written discharge instructions were provided to the patient.    The anesthetics, sedatives or narcotics which were given to you today will be acting in your body for the next 24 hours, so you might feel a little sleepy or groggy.  This feeling should slowly wear off. Carefully read and follow the instructions.     You received sedation today:  - Do not drive or operate any machinery or power tools of any kind.   - No alcoholic beverages today, not even beer or wine.  - Do not make any important decisions or sign any legal documents.  - No over the counter medications that contain alcohol or that may cause drowsiness.  - Do not make any important decisions or sign any legal documents.    While it is common to experience mild to moderate abdominal distention, gas, or belching after your procedure, if any of these symptoms occur following discharge from the GI Lab or within one week of having your procedure, call the [facility] to be advised whether a visit to your nearest Urgent Care or Emergency Department is indicated.  Take this paper with you if you go:  - If you develop an allergic reaction to the medications that were given during your procedure such as difficulty breathing, rash, hives, severe nausea,  vomiting or lightheadedness.  - If you experience chest pain, shortness of breath, severe abdominal pain, fevers and chills.  - If you develop signs and symptoms of bleeding such as blood in your spit, if your stools turn black, tarry, or bloody.  - If you have not urinated within 8 hours following your procedure.  - If your IV site becomes painful, red, inflamed, or looks infected.       If you experience any problems or have any questions following discharge from the GI Lab, please call: [phone]

## 2025-05-06 NOTE — ANESTHESIA POSTPROCEDURE EVALUATION
Patient: Kirill Adam    Procedure Summary       Date: 05/06/25 Room / Location: [facility]    Anesthesia Start: 1506 Anesthesia Stop: 1542    Procedure: COLONOSCOPY Diagnosis: Colon polyp    Scheduled Providers: Be Alfaro MD Responsible Provider: Evelyn Rojas MD    Anesthesia Type: MAC ASA Status: 3            Anesthesia Type: MAC    Vitals Value Taken Time   /59 05/06/25 15:41   Temp 36 °C (96.8 °F) 05/06/25 15:41   Pulse 68 05/06/25 15:41   Resp 16 05/06/25 15:41   SpO2 94 % 05/06/25 15:41       Anesthesia Post Evaluation    Patient location during evaluation: PACU  Patient participation: complete - patient participated  Level of consciousness: sleepy but conscious  Pain score: 0  Pain management: adequate  Airway patency: patent  Cardiovascular status: acceptable  Respiratory status: acceptable  Hydration status: acceptable  Postoperative Nausea and Vomiting: none      No notable events documented.

## 2025-05-06 NOTE — PROGRESS NOTES
"Kirill Adam is a 74 y.o. male on day 0 of admission presenting with No Principal Problem: There is no principal problem currently on the Problem List. Please update the Problem List and refresh..    Subjective   Advance Directives       Objective     Physical Exam    Last Recorded Vitals  Blood pressure 144/84, pulse 75, temperature 36.6 °C (97.9 °F), resp. rate 18, height 1.88 m (6' 2\"), weight 113 kg (249 lb), SpO2 94%.  Intake/Output last 3 Shifts:  No intake/output data recorded.    Relevant Results                              Assessment & Plan              Jude Elizalde    "

## 2025-05-08 DIAGNOSIS — E11.9 TYPE 2 DIABETES MELLITUS WITHOUT COMPLICATION, WITHOUT LONG-TERM CURRENT USE OF INSULIN: ICD-10-CM

## 2025-05-14 ENCOUNTER — TELEPHONE (OUTPATIENT)
Dept: GASTROENTEROLOGY | Facility: CLINIC | Age: 75
End: 2025-05-14
Payer: MEDICARE

## 2025-05-14 NOTE — TELEPHONE ENCOUNTER
"----- Message from Tamiko LEWIS sent at 5/5/2025  8:55 AM EDT -----  Patients spouse called reception and sent it to me, Patient scheduled with Dominic at the request of Dr Marina. Patients very upset, patients spouse was very upset and while she was fine, not yelling at me she states, but still raised voice as she was so mad. She is really mad at the way things were handled with KAROLINA. She was calling reception to cancel because they had not sent a prep, and there according to them havd not been any communication after they had reached out repeatedly to . I spoke with her letting her know the prep had been sent this morning and was available, and it would be better if they did not cancel as DR. CHEEK had wanted this procedure done with Dr. MOULTON for a reason. They agreed to keep apt, I sent instructions via email, and spoke with [facility] so they didn't cancel the appointment as apparently they tried to cancel but it didn't go through the system.Patient spoke for 15 minutes about what they were unhappy with and went into great detail. She said she's \"pissed off\" and that this \"makes Dr Alfaro and [facility] look horrible\"and expressed they were losing colin in [facility] and the care they give. The patient, her  , was in the background yelling similar comments.I let her know I would pass the information on to you and she would like a call from you ASAP. I explained you were out today and would be sent a message and return her call once you returned and were able to. She was ok with that. She said she wasn't sure \" if I trust myself with these nurses and Dr Alfaro , because Im really pissed\".I did my best to help with the situation and by end of conversation, procedure was still going to be had tomorrow with Dr. Alfaro and patient had the information they needed.Please call patients wife Misa. Number in chart is for both of them.  "

## 2025-05-23 LAB
LABORATORY COMMENT REPORT: NORMAL
PATH REPORT.FINAL DX SPEC: NORMAL
PATH REPORT.GROSS SPEC: NORMAL
PATH REPORT.RELEVANT HX SPEC: NORMAL
PATH REPORT.TOTAL CANCER: NORMAL

## 2025-06-04 DIAGNOSIS — D12.2 ADENOMATOUS POLYP OF ASCENDING COLON: Primary | ICD-10-CM

## 2025-06-13 ENCOUNTER — APPOINTMENT (OUTPATIENT)
Dept: PRIMARY CARE | Facility: CLINIC | Age: 75
End: 2025-06-13
Payer: MEDICARE

## 2025-06-16 ENCOUNTER — APPOINTMENT (OUTPATIENT)
Dept: PRIMARY CARE | Facility: CLINIC | Age: 75
End: 2025-06-16
Payer: MEDICARE

## 2025-06-18 DIAGNOSIS — Z12.11 COLON CANCER SCREENING: ICD-10-CM

## 2025-06-19 RX ORDER — SODIUM, POTASSIUM,MAG SULFATES 17.5-3.13G
1 SOLUTION, RECONSTITUTED, ORAL ORAL EVERY 12 HOURS
Qty: 2 EACH | Refills: 0 | Status: SHIPPED | OUTPATIENT
Start: 2025-06-19

## 2025-06-24 LAB
25(OH)D3+25(OH)D2 SERPL-MCNC: 59 NG/ML (ref 30–100)
ALBUMIN SERPL-MCNC: 4.2 G/DL (ref 3.6–5.1)
ALP SERPL-CCNC: 80 U/L (ref 35–144)
ALT SERPL-CCNC: 20 U/L (ref 9–46)
ANION GAP SERPL CALCULATED.4IONS-SCNC: 8 MMOL/L (CALC) (ref 7–17)
AST SERPL-CCNC: 18 U/L (ref 10–35)
BASOPHILS # BLD AUTO: 63 CELLS/UL (ref 0–200)
BASOPHILS NFR BLD AUTO: 0.8 %
BILIRUB SERPL-MCNC: 0.8 MG/DL (ref 0.2–1.2)
BUN SERPL-MCNC: 23 MG/DL (ref 7–25)
CALCIUM SERPL-MCNC: 9.4 MG/DL (ref 8.6–10.3)
CHLORIDE SERPL-SCNC: 105 MMOL/L (ref 98–110)
CHOLEST SERPL-MCNC: 157 MG/DL
CHOLEST/HDLC SERPL: 3.5 (CALC)
CO2 SERPL-SCNC: 27 MMOL/L (ref 20–32)
CREAT SERPL-MCNC: 1.01 MG/DL (ref 0.7–1.28)
EGFRCR SERPLBLD CKD-EPI 2021: 78 ML/MIN/1.73M2
EOSINOPHIL # BLD AUTO: 679 CELLS/UL (ref 15–500)
EOSINOPHIL NFR BLD AUTO: 8.6 %
ERYTHROCYTE [DISTWIDTH] IN BLOOD BY AUTOMATED COUNT: 13.6 % (ref 11–15)
GLUCOSE SERPL-MCNC: 132 MG/DL (ref 65–99)
HCT VFR BLD AUTO: 45.8 % (ref 38.5–50)
HDLC SERPL-MCNC: 45 MG/DL
HGB BLD-MCNC: 14.8 G/DL (ref 13.2–17.1)
LDLC SERPL CALC-MCNC: 86 MG/DL (CALC)
LYMPHOCYTES # BLD AUTO: 2773 CELLS/UL (ref 850–3900)
LYMPHOCYTES NFR BLD AUTO: 35.1 %
MCH RBC QN AUTO: 29.7 PG (ref 27–33)
MCHC RBC AUTO-ENTMCNC: 32.3 G/DL (ref 32–36)
MCV RBC AUTO: 91.8 FL (ref 80–100)
MONOCYTES # BLD AUTO: 442 CELLS/UL (ref 200–950)
MONOCYTES NFR BLD AUTO: 5.6 %
NEUTROPHILS # BLD AUTO: 3942 CELLS/UL (ref 1500–7800)
NEUTROPHILS NFR BLD AUTO: 49.9 %
NONHDLC SERPL-MCNC: 112 MG/DL (CALC)
PLATELET # BLD AUTO: 223 THOUSAND/UL (ref 140–400)
PMV BLD REES-ECKER: 11.2 FL (ref 7.5–12.5)
POTASSIUM SERPL-SCNC: 4.6 MMOL/L (ref 3.5–5.3)
PROT SERPL-MCNC: 6.3 G/DL (ref 6.1–8.1)
PSA SERPL-MCNC: 1.31 NG/ML
RBC # BLD AUTO: 4.99 MILLION/UL (ref 4.2–5.8)
SODIUM SERPL-SCNC: 140 MMOL/L (ref 135–146)
T3FREE SERPL-MCNC: 3.3 PG/ML (ref 2.3–4.2)
TRIGL SERPL-MCNC: 166 MG/DL
TSH SERPL-ACNC: 1.94 MIU/L (ref 0.4–4.5)
WBC # BLD AUTO: 7.9 THOUSAND/UL (ref 3.8–10.8)

## 2025-07-03 ENCOUNTER — APPOINTMENT (OUTPATIENT)
Dept: PRIMARY CARE | Facility: CLINIC | Age: 75
End: 2025-07-03
Payer: MEDICARE

## 2025-07-03 VITALS
BODY MASS INDEX: 32.08 KG/M2 | SYSTOLIC BLOOD PRESSURE: 138 MMHG | HEIGHT: 74 IN | DIASTOLIC BLOOD PRESSURE: 82 MMHG | TEMPERATURE: 98.3 F | WEIGHT: 250 LBS | HEART RATE: 83 BPM | RESPIRATION RATE: 18 BRPM | OXYGEN SATURATION: 96 %

## 2025-07-03 DIAGNOSIS — E11.59 HYPERTENSION ASSOCIATED WITH DIABETES: ICD-10-CM

## 2025-07-03 DIAGNOSIS — Z00.00 ENCOUNTER FOR SUBSEQUENT ANNUAL WELLNESS VISIT (AWV) IN MEDICARE PATIENT: Primary | ICD-10-CM

## 2025-07-03 DIAGNOSIS — E78.5 HYPERLIPIDEMIA ASSOCIATED WITH TYPE 2 DIABETES MELLITUS: ICD-10-CM

## 2025-07-03 DIAGNOSIS — E11.69 HYPERLIPIDEMIA ASSOCIATED WITH TYPE 2 DIABETES MELLITUS: ICD-10-CM

## 2025-07-03 DIAGNOSIS — E11.9 TYPE 2 DIABETES MELLITUS WITHOUT COMPLICATION, WITHOUT LONG-TERM CURRENT USE OF INSULIN: ICD-10-CM

## 2025-07-03 DIAGNOSIS — I15.2 HYPERTENSION ASSOCIATED WITH DIABETES: ICD-10-CM

## 2025-07-03 LAB — POC HEMOGLOBIN A1C: 7.3 % (ref 4.2–6.5)

## 2025-07-03 PROCEDURE — 99214 OFFICE O/P EST MOD 30 MIN: CPT | Performed by: INTERNAL MEDICINE

## 2025-07-03 PROCEDURE — 1036F TOBACCO NON-USER: CPT | Performed by: INTERNAL MEDICINE

## 2025-07-03 PROCEDURE — 3051F HG A1C>EQUAL 7.0%<8.0%: CPT | Performed by: INTERNAL MEDICINE

## 2025-07-03 PROCEDURE — G0136 PR ADMINISTRATION OF A STANDARDIZED, EVIDENCE-BASED SOCIAL DETERMINANTS OF HEALTH RISK ASSESSMENT TOOL, 5 TO 15 MINUTES: HCPCS | Performed by: INTERNAL MEDICINE

## 2025-07-03 PROCEDURE — 1160F RVW MEDS BY RX/DR IN RCRD: CPT | Performed by: INTERNAL MEDICINE

## 2025-07-03 PROCEDURE — G0444 DEPRESSION SCREEN ANNUAL: HCPCS | Performed by: INTERNAL MEDICINE

## 2025-07-03 PROCEDURE — 1170F FXNL STATUS ASSESSED: CPT | Performed by: INTERNAL MEDICINE

## 2025-07-03 PROCEDURE — 99497 ADVNCD CARE PLAN 30 MIN: CPT | Performed by: INTERNAL MEDICINE

## 2025-07-03 PROCEDURE — 3008F BODY MASS INDEX DOCD: CPT | Performed by: INTERNAL MEDICINE

## 2025-07-03 PROCEDURE — 3075F SYST BP GE 130 - 139MM HG: CPT | Performed by: INTERNAL MEDICINE

## 2025-07-03 PROCEDURE — 4010F ACE/ARB THERAPY RXD/TAKEN: CPT | Performed by: INTERNAL MEDICINE

## 2025-07-03 PROCEDURE — 83036 HEMOGLOBIN GLYCOSYLATED A1C: CPT | Performed by: INTERNAL MEDICINE

## 2025-07-03 PROCEDURE — G0446 INTENS BEHAVE THER CARDIO DX: HCPCS | Performed by: INTERNAL MEDICINE

## 2025-07-03 PROCEDURE — G0439 PPPS, SUBSEQ VISIT: HCPCS | Performed by: INTERNAL MEDICINE

## 2025-07-03 PROCEDURE — 1126F AMNT PAIN NOTED NONE PRSNT: CPT | Performed by: INTERNAL MEDICINE

## 2025-07-03 PROCEDURE — 3078F DIAST BP <80 MM HG: CPT | Performed by: INTERNAL MEDICINE

## 2025-07-03 PROCEDURE — G2211 COMPLEX E/M VISIT ADD ON: HCPCS | Performed by: INTERNAL MEDICINE

## 2025-07-03 PROCEDURE — 1159F MED LIST DOCD IN RCRD: CPT | Performed by: INTERNAL MEDICINE

## 2025-07-03 ASSESSMENT — ACTIVITIES OF DAILY LIVING (ADL)
TAKING_MEDICATION: INDEPENDENT
BATHING: INDEPENDENT
DOING_HOUSEWORK: INDEPENDENT
MANAGING_FINANCES: INDEPENDENT
GROCERY_SHOPPING: INDEPENDENT
DRESSING: INDEPENDENT

## 2025-07-03 ASSESSMENT — PAIN SCALES - GENERAL: PAINLEVEL_OUTOF10: 0-NO PAIN

## 2025-07-03 NOTE — PROGRESS NOTES
Subjective     Patient ID: Kirill Adam is a 75 y.o. male who presents for Medicare Annual Wellness Visit Subsequent (A1c 7.3).  History of Present Illness  The patient presents for evaluation of diabetes, elevated blood pressure, and elevated triglycerides.    Comes today for annual medical check up.  Kirill Adam is overall doing well. Chronic medical conditions are stable with current medical management. Memory and cognitive function are assessed with simple verbal cue and interview. Preventative Immunizations are age appropriately up to date. Current  home medications have been reconciled. The healthy lifestyle has been reinforced. Encouraged continued avoidance of tobacco, alcohol, non prescription drugs and poly pharmacy. Functional capacity has been assessed with quick balance and gait tests. Discussed about fall risk and safety measures at home and outside. Age appropriate cancer screening tests were recommended and ordered today. Discussed in details about advance directives, code status and health care Power of  (POA). All together it took about 16 minutes and the chart was updated. Discussed about mental health and wellbeing. The depression screening questionnaire (PHQ 9) was performed and plan was discussed for 5 mins. Another 5 minute was spent on screening the social determinants of health (SDOH), specifically housing, food insecurity, utilities and transportation.  All physical, mental social and spiritual aspects of taylor were evaluated, assessed and appropriately addressed. Today's office vital signs, recent lab results, EKG and imaging studies were reviewed. Patient expressed concern about diabetes type 2, hyperlipidemia, hypertension.  So a complete physical exam was performed to evaluate and address the issue.      Health Counseling    Advance Directives Discussion  16 - 20 minutes were spent discussing Advanced Care Planning (including a Living Will, Medical Power Of ,  as well as specific end of life choices and/or directives). The details of that discussion were documented in Advanced Directives Discussion section of the medical record.     Cardiac Risk Assessment  15 - 20 minutes were spent discussing Cardiovascular risk and, if needed, lifestyle modifications were recommended, including nutritional choices, exercise, and elimination of habits contributing to risk.   Aspirin use/disuse was discussed following the guidelines below:  low dose ASA ( mg) should be considered:    If prior Heart Attack/Stroke/Peripheral vascular disease:  Generally recommend daily low dose aspirin unless extremely high bleeding risk (e.g., gastrointestinal).    If no prior Heart Attack/Stroke/Peripheral vascular disease:              Age over 70: Do not use Aspirin for prevention    Age less than 70 and 10-year cardiovascular disease risk is >20%: use low dose Aspirin for prevention.                 Depression Screening  5 - 10 minutes were spent screening for depression.      He has been actively managing his diabetes through increased water intake and physical activity, including yard work. His recent A1c level is 7.3, down from 7.6. He reports no need for medication refills at this time.    He occasionally monitors his blood pressure at home but has been unable to do so recently due to a busy schedule. He consumed a caffeinated beverage prior to his office visit today.    He underwent his first colonoscopy approximately 1.5 months ago, which revealed a polyp larger than 1 cm. A subsequent procedure was performed by a different physician to remove a deeper lesion. He is scheduled for a follow-up visit in 11/2025. He reports no memory issues or mental health concerns. He has designated his wife as his power of  and ensures that she has all necessary documentation. He has access to three meals daily and does not experience any food insecurity. He has no issues with transportation and  "recently restored his 2007 pickup truck. He reports no black, tarry stools but mentions the presence of hemorrhoids. He also reports no respiratory issues such as asthma or wheezing. He has a history of kidney stones and was informed of severe coronary artery calcification during a hospital visit. However, a subsequent nuclear stress test did not reveal any abnormalities. He reports no ankle swelling and attributes this to wearing good quality shoes. He takes vitamins regularly.    He has a history of back pain, which has improved following surgery.    PAST SURGICAL HISTORY:  Colonoscopy with polyp removal (05/06/2025)  Back surgery    SOCIAL HISTORY  He does not smoke.    Objective   Vitals:    07/03/25 0928 07/03/25 1520   BP: 152/78 138/82   BP Location: Left arm    Patient Position: Sitting    BP Cuff Size: Adult    Pulse: 83    Resp: 18    Temp: 36.8 °C (98.3 °F)    TempSrc: Temporal    SpO2: 96%    Weight: 113 kg (250 lb)    Height: 1.88 m (6' 2\")      Wt Readings from Last 10 Encounters:   07/03/25 113 kg (250 lb)   05/06/25 113 kg (249 lb)   03/12/25 114 kg (251 lb)   01/13/25 114 kg (252 lb)   01/08/25 117 kg (258 lb)   12/30/24 117 kg (259 lb)   12/12/24 113 kg (249 lb)   11/22/24 117 kg (259 lb)   08/30/23 103 kg (227 lb)   05/01/23 103 kg (228 lb 2 oz)       Medication:  Current Outpatient Medications   Medication Instructions    amLODIPine (NORVASC) 5 mg, oral, Daily    ascorbic acid (VITAMIN C) 250 mg, Daily    aspirin 81 mg EC tablet     atorvastatin (LIPITOR) 40 mg, oral, Nightly    cholecalciferol, vitamin D3, 75 mcg (3,000 unit) tablet Take by mouth.    hydroCHLOROthiazide (MICROZIDE) 12.5 mg, oral, Daily    losartan (COZAAR) 50 mg, oral, Daily    metFORMIN (GLUCOPHAGE) 1,000 mg, oral, 2 times daily (morning and late afternoon), Take with food.    multivitamin tablet 1 tablet, Daily RT    sildenafil (VIAGRA) 100 mg, oral, Daily PRN    sodium,potassium,mag sulfates (Suprep Bowel Prep Kit) " 17.5-3.13-1.6 gram solution 1 bottle, oral, Every 12 hours       Physical Exam  Respiratory: Clear to auscultation, no wheezing, rales or rhonchi  Other: Blood pressure reading is 152/78, repeat blood pressure: 138/82 mmHg.  HE ENT: No pallor, No carotid bruit,  No thyromegaly   Heart:  RRR, S1, S2, No murmur   Abd: No epigastric tenderness, No CVA tenderness   Extremities: No edema, calf swelling or tenderness.    Skin:  No rash, ecchymosis or erythema.      Review of Systems:  Respiratory:  Denies stridor. No blood in sputum   Cardiovascular:  Denies chest pain, no sudden excessive sweating   Gastrointestinal:  Denies sour burping, no blood in stool    Genitourinary:  Denies blood in urine    Skin:  No new spot changing color or shape or border    Neurological: Denies speech difficulty, no memory disturbance        Recent Labs:   Lab Results   Component Value Date    WBC 7.9 06/24/2025    HGB 14.8 06/24/2025    HCT 45.8 06/24/2025     06/24/2025    CHOL 157 06/24/2025    TRIG 166 (H) 06/24/2025    HDL 45 06/24/2025    ALT 20 06/24/2025    AST 18 06/24/2025     06/24/2025    K 4.6 06/24/2025     06/24/2025    CREATININE 1.01 06/24/2025    BUN 23 06/24/2025    CO2 27 06/24/2025    TSH 1.94 06/24/2025    PSA 1.31 06/24/2025    HGBA1C 7.3 (A) 07/03/2025     Lab Results   Component Value Date    GLUCOSE 132 (H) 06/24/2025    CALCIUM 9.4 06/24/2025     06/24/2025    K 4.6 06/24/2025    CO2 27 06/24/2025     06/24/2025    BUN 23 06/24/2025    CREATININE 1.01 06/24/2025      Lab Results   Component Value Date    LDLCALC 86 06/24/2025     Lab Results   Component Value Date    HGBA1C 7.3 (A) 07/03/2025    HGBA1C 7.6 (A) 03/12/2025     Lab Results   Component Value Date    LDLCALC 86 06/24/2025    CREATININE 1.01 06/24/2025     Lab Results   Component Value Date    PSA 1.31 06/24/2025    PSA 1.90 02/08/2023    PSA 1.40 09/13/2021       Diagnosis and Orders:       Encounter for subsequent annual  wellness visit (AWV) in Medicare patient  Type 2 diabetes mellitus without complication, without long-term current use of insulin  -     A1c today in the office is 7.3%..  Hyperlipidemia associated with type 2 diabetes mellitus  Hypertension associated with diabetes         Assessment & Plan  1. Diabetes mellitus.  - A1c levels have shown improvement, decreasing from 7.6 to 7.3.  - Target A1c level remains at 6.5.  - Advised to continue current regimen, including increased water intake and physical activity.  - No new medications prescribed at this time.    2. Elevated blood pressure.  - Blood pressure recorded at 152/78 today, slightly elevated.  - Advised to monitor blood pressure at home more regularly.  - Mentioned having pop before the visit, which may have affected the reading.  - No changes in medication at this time.    3. Elevated triglycerides.  - Triglyceride levels noted to be slightly elevated at 166 mg/dL.  - Advised to reduce intake of foods high in triglycerides, such as butter, cheese, mayonnaise, salad dressing, and margarine.  - No new medications prescribed at this time.  - Will re-evaluate at the next visit.    4. Health maintenance.  - CBC results are within normal limits, indicating no anemia or other issues.  - Thyroid function and vitamin D levels are within normal ranges.  - Colonoscopy on 05/06/2025 revealed a polyp larger than 1 cm (20 mm); biopsy results were satisfactory.  - Scheduled for a follow-up colonoscopy in 11/2025.    Follow-up  The patient will follow up in 6 months.              This medical note was created with the assistance of artificial intelligence (AI) for documentation purposes. The content has been reviewed and confirmed by the healthcare provider for accuracy and completeness. Patient consented to the use of audio recording and use of AI during their visit.

## 2026-01-05 ENCOUNTER — APPOINTMENT (OUTPATIENT)
Dept: PRIMARY CARE | Facility: CLINIC | Age: 76
End: 2026-01-05
Payer: MEDICARE

## 2026-01-07 ENCOUNTER — APPOINTMENT (OUTPATIENT)
Dept: CARDIOLOGY | Facility: CLINIC | Age: 76
End: 2026-01-07
Payer: MEDICARE